# Patient Record
Sex: MALE | Race: WHITE | Employment: UNEMPLOYED | URBAN - METROPOLITAN AREA
[De-identification: names, ages, dates, MRNs, and addresses within clinical notes are randomized per-mention and may not be internally consistent; named-entity substitution may affect disease eponyms.]

---

## 2018-08-21 ENCOUNTER — ANESTHESIA EVENT (OUTPATIENT)
Dept: SURGERY | Age: 43
DRG: 853 | End: 2018-08-21
Payer: MEDICARE

## 2018-08-21 ENCOUNTER — ANESTHESIA (OUTPATIENT)
Dept: SURGERY | Age: 43
DRG: 853 | End: 2018-08-21
Payer: MEDICARE

## 2018-08-21 ENCOUNTER — HOSPITAL ENCOUNTER (INPATIENT)
Age: 43
LOS: 8 days | Discharge: HOME HEALTH | DRG: 853 | End: 2018-08-29
Attending: EMERGENCY MEDICINE | Admitting: INTERNAL MEDICINE
Payer: MEDICARE

## 2018-08-21 DIAGNOSIS — N49.2 SCROTAL ABSCESS: Primary | ICD-10-CM

## 2018-08-21 DIAGNOSIS — K92.2 LOWER GI BLEEDING: ICD-10-CM

## 2018-08-21 DIAGNOSIS — K61.1 PERIRECTAL ABSCESS: ICD-10-CM

## 2018-08-21 PROBLEM — A41.9 SEPSIS (HCC): Status: ACTIVE | Noted: 2018-08-21

## 2018-08-21 PROBLEM — L02.31 GLUTEAL ABSCESS: Status: ACTIVE | Noted: 2018-08-21

## 2018-08-21 PROCEDURE — 76060000036 HC ANESTHESIA 2.5 TO 3 HR: Performed by: SURGERY

## 2018-08-21 PROCEDURE — 74011250636 HC RX REV CODE- 250/636: Performed by: INTERNAL MEDICINE

## 2018-08-21 PROCEDURE — 99285 EMERGENCY DEPT VISIT HI MDM: CPT

## 2018-08-21 PROCEDURE — 76210000006 HC OR PH I REC 0.5 TO 1 HR: Performed by: SURGERY

## 2018-08-21 PROCEDURE — 77030008684 HC TU ET CUF COVD -B: Performed by: NURSE ANESTHETIST, CERTIFIED REGISTERED

## 2018-08-21 PROCEDURE — 74011250637 HC RX REV CODE- 250/637: Performed by: INTERNAL MEDICINE

## 2018-08-21 PROCEDURE — 96374 THER/PROPH/DIAG INJ IV PUSH: CPT

## 2018-08-21 PROCEDURE — 74011000272 HC RX REV CODE- 272: Performed by: SURGERY

## 2018-08-21 PROCEDURE — 74011000250 HC RX REV CODE- 250

## 2018-08-21 PROCEDURE — 87077 CULTURE AEROBIC IDENTIFY: CPT | Performed by: SURGERY

## 2018-08-21 PROCEDURE — 74011250636 HC RX REV CODE- 250/636

## 2018-08-21 PROCEDURE — 76010000132 HC OR TIME 2.5 TO 3 HR: Performed by: SURGERY

## 2018-08-21 PROCEDURE — 65270000029 HC RM PRIVATE

## 2018-08-21 PROCEDURE — 77030011640 HC PAD GRND REM COVD -A: Performed by: SURGERY

## 2018-08-21 PROCEDURE — 77030018836 HC SOL IRR NACL ICUM -A

## 2018-08-21 PROCEDURE — 87186 SC STD MICRODIL/AGAR DIL: CPT | Performed by: SURGERY

## 2018-08-21 PROCEDURE — 0TJB8ZZ INSPECTION OF BLADDER, VIA NATURAL OR ARTIFICIAL OPENING ENDOSCOPIC: ICD-10-PCS | Performed by: UROLOGY

## 2018-08-21 PROCEDURE — 77030018836 HC SOL IRR NACL ICUM -A: Performed by: SURGERY

## 2018-08-21 PROCEDURE — 87147 CULTURE TYPE IMMUNOLOGIC: CPT | Performed by: SURGERY

## 2018-08-21 PROCEDURE — 88304 TISSUE EXAM BY PATHOLOGIST: CPT | Performed by: SURGERY

## 2018-08-21 PROCEDURE — 77030007851 HC IRR CYSTO/TUR BSC -B: Performed by: SURGERY

## 2018-08-21 PROCEDURE — 74011000250 HC RX REV CODE- 250: Performed by: SURGERY

## 2018-08-21 PROCEDURE — 0JBB0ZZ EXCISION OF PERINEUM SUBCUTANEOUS TISSUE AND FASCIA, OPEN APPROACH: ICD-10-PCS | Performed by: UROLOGY

## 2018-08-21 PROCEDURE — 87205 SMEAR GRAM STAIN: CPT | Performed by: SURGERY

## 2018-08-21 PROCEDURE — 87075 CULTR BACTERIA EXCEPT BLOOD: CPT | Performed by: SURGERY

## 2018-08-21 PROCEDURE — 87070 CULTURE OTHR SPECIMN AEROBIC: CPT | Performed by: SURGERY

## 2018-08-21 PROCEDURE — 74011250636 HC RX REV CODE- 250/636: Performed by: EMERGENCY MEDICINE

## 2018-08-21 PROCEDURE — 0DJD8ZZ INSPECTION OF LOWER INTESTINAL TRACT, VIA NATURAL OR ARTIFICIAL OPENING ENDOSCOPIC: ICD-10-PCS | Performed by: SURGERY

## 2018-08-21 PROCEDURE — 77030026438 HC STYL ET INTUB CARD -A: Performed by: NURSE ANESTHETIST, CERTIFIED REGISTERED

## 2018-08-21 RX ORDER — HYDROMORPHONE HYDROCHLORIDE 2 MG/ML
INJECTION, SOLUTION INTRAMUSCULAR; INTRAVENOUS; SUBCUTANEOUS AS NEEDED
Status: DISCONTINUED | OUTPATIENT
Start: 2018-08-21 | End: 2018-08-21 | Stop reason: HOSPADM

## 2018-08-21 RX ORDER — MIDAZOLAM HYDROCHLORIDE 1 MG/ML
INJECTION, SOLUTION INTRAMUSCULAR; INTRAVENOUS AS NEEDED
Status: DISCONTINUED | OUTPATIENT
Start: 2018-08-21 | End: 2018-08-21 | Stop reason: HOSPADM

## 2018-08-21 RX ORDER — VANCOMYCIN/0.9 % SOD CHLORIDE 1 G/100 ML
1000 PLASTIC BAG, INJECTION (ML) INTRAVENOUS EVERY 8 HOURS
Status: DISCONTINUED | OUTPATIENT
Start: 2018-08-21 | End: 2018-08-21

## 2018-08-21 RX ORDER — LEVOFLOXACIN 5 MG/ML
500 INJECTION, SOLUTION INTRAVENOUS EVERY 24 HOURS
Status: DISCONTINUED | OUTPATIENT
Start: 2018-08-22 | End: 2018-08-22

## 2018-08-21 RX ORDER — OXYCODONE HYDROCHLORIDE 5 MG/1
5 TABLET ORAL
Status: DISCONTINUED | OUTPATIENT
Start: 2018-08-21 | End: 2018-08-27

## 2018-08-21 RX ORDER — ROCURONIUM BROMIDE 10 MG/ML
INJECTION, SOLUTION INTRAVENOUS AS NEEDED
Status: DISCONTINUED | OUTPATIENT
Start: 2018-08-21 | End: 2018-08-21 | Stop reason: HOSPADM

## 2018-08-21 RX ORDER — LIDOCAINE HYDROCHLORIDE 20 MG/ML
INJECTION, SOLUTION EPIDURAL; INFILTRATION; INTRACAUDAL; PERINEURAL AS NEEDED
Status: DISCONTINUED | OUTPATIENT
Start: 2018-08-21 | End: 2018-08-21 | Stop reason: HOSPADM

## 2018-08-21 RX ORDER — SODIUM CHLORIDE, SODIUM LACTATE, POTASSIUM CHLORIDE, CALCIUM CHLORIDE 600; 310; 30; 20 MG/100ML; MG/100ML; MG/100ML; MG/100ML
INJECTION, SOLUTION INTRAVENOUS
Status: DISCONTINUED | OUTPATIENT
Start: 2018-08-21 | End: 2018-08-21 | Stop reason: HOSPADM

## 2018-08-21 RX ORDER — DEXAMETHASONE SODIUM PHOSPHATE 4 MG/ML
INJECTION, SOLUTION INTRA-ARTICULAR; INTRALESIONAL; INTRAMUSCULAR; INTRAVENOUS; SOFT TISSUE AS NEEDED
Status: DISCONTINUED | OUTPATIENT
Start: 2018-08-21 | End: 2018-08-21 | Stop reason: HOSPADM

## 2018-08-21 RX ORDER — ONDANSETRON 2 MG/ML
4 INJECTION INTRAMUSCULAR; INTRAVENOUS
Status: DISCONTINUED | OUTPATIENT
Start: 2018-08-21 | End: 2018-08-29 | Stop reason: HOSPADM

## 2018-08-21 RX ORDER — PROPOFOL 10 MG/ML
INJECTION, EMULSION INTRAVENOUS AS NEEDED
Status: DISCONTINUED | OUTPATIENT
Start: 2018-08-21 | End: 2018-08-21 | Stop reason: HOSPADM

## 2018-08-21 RX ORDER — HYDROMORPHONE HYDROCHLORIDE 2 MG/ML
1 INJECTION, SOLUTION INTRAMUSCULAR; INTRAVENOUS; SUBCUTANEOUS
Status: DISCONTINUED | OUTPATIENT
Start: 2018-08-21 | End: 2018-08-29 | Stop reason: HOSPADM

## 2018-08-21 RX ORDER — IBUPROFEN 200 MG
1 TABLET ORAL DAILY
Status: DISCONTINUED | OUTPATIENT
Start: 2018-08-21 | End: 2018-08-29 | Stop reason: HOSPADM

## 2018-08-21 RX ORDER — METRONIDAZOLE 500 MG/100ML
500 INJECTION, SOLUTION INTRAVENOUS EVERY 12 HOURS
Status: DISPENSED | OUTPATIENT
Start: 2018-08-21 | End: 2018-08-28

## 2018-08-21 RX ORDER — ONDANSETRON 2 MG/ML
INJECTION INTRAMUSCULAR; INTRAVENOUS AS NEEDED
Status: DISCONTINUED | OUTPATIENT
Start: 2018-08-21 | End: 2018-08-21 | Stop reason: HOSPADM

## 2018-08-21 RX ORDER — PANTOPRAZOLE SODIUM 40 MG/1
40 TABLET, DELAYED RELEASE ORAL
Status: DISCONTINUED | OUTPATIENT
Start: 2018-08-22 | End: 2018-08-29 | Stop reason: HOSPADM

## 2018-08-21 RX ORDER — POTASSIUM CHLORIDE 750 MG/1
40 TABLET, FILM COATED, EXTENDED RELEASE ORAL
Status: COMPLETED | OUTPATIENT
Start: 2018-08-21 | End: 2018-08-21

## 2018-08-21 RX ORDER — ACETAMINOPHEN 10 MG/ML
INJECTION, SOLUTION INTRAVENOUS AS NEEDED
Status: DISCONTINUED | OUTPATIENT
Start: 2018-08-21 | End: 2018-08-21 | Stop reason: HOSPADM

## 2018-08-21 RX ORDER — ONDANSETRON 2 MG/ML
4 INJECTION INTRAMUSCULAR; INTRAVENOUS
Status: COMPLETED | OUTPATIENT
Start: 2018-08-21 | End: 2018-08-21

## 2018-08-21 RX ORDER — POLYETHYLENE GLYCOL 3350 17 G/17G
17 POWDER, FOR SOLUTION ORAL DAILY
Status: DISCONTINUED | OUTPATIENT
Start: 2018-08-21 | End: 2018-08-29 | Stop reason: HOSPADM

## 2018-08-21 RX ORDER — SUCCINYLCHOLINE CHLORIDE 20 MG/ML
INJECTION INTRAMUSCULAR; INTRAVENOUS AS NEEDED
Status: DISCONTINUED | OUTPATIENT
Start: 2018-08-21 | End: 2018-08-21 | Stop reason: HOSPADM

## 2018-08-21 RX ORDER — FENTANYL CITRATE 50 UG/ML
INJECTION, SOLUTION INTRAMUSCULAR; INTRAVENOUS AS NEEDED
Status: DISCONTINUED | OUTPATIENT
Start: 2018-08-21 | End: 2018-08-21 | Stop reason: HOSPADM

## 2018-08-21 RX ORDER — SODIUM CHLORIDE 9 MG/ML
100 INJECTION, SOLUTION INTRAVENOUS CONTINUOUS
Status: DISCONTINUED | OUTPATIENT
Start: 2018-08-21 | End: 2018-08-29 | Stop reason: HOSPADM

## 2018-08-21 RX ORDER — ACETAMINOPHEN 325 MG/1
650 TABLET ORAL
Status: DISCONTINUED | OUTPATIENT
Start: 2018-08-21 | End: 2018-08-29 | Stop reason: HOSPADM

## 2018-08-21 RX ADMIN — LIDOCAINE HYDROCHLORIDE 80 MG: 20 INJECTION, SOLUTION EPIDURAL; INFILTRATION; INTRACAUDAL; PERINEURAL at 19:01

## 2018-08-21 RX ADMIN — MIDAZOLAM HYDROCHLORIDE 2 MG: 1 INJECTION, SOLUTION INTRAMUSCULAR; INTRAVENOUS at 18:58

## 2018-08-21 RX ADMIN — FENTANYL CITRATE 100 MCG: 50 INJECTION, SOLUTION INTRAMUSCULAR; INTRAVENOUS at 20:00

## 2018-08-21 RX ADMIN — OXYCODONE HYDROCHLORIDE 5 MG: 5 TABLET ORAL at 15:43

## 2018-08-21 RX ADMIN — ONDANSETRON 4 MG: 2 INJECTION, SOLUTION INTRAMUSCULAR; INTRAVENOUS at 10:59

## 2018-08-21 RX ADMIN — SUCCINYLCHOLINE CHLORIDE 180 MG: 20 INJECTION INTRAMUSCULAR; INTRAVENOUS at 19:01

## 2018-08-21 RX ADMIN — LIDOCAINE HYDROCHLORIDE 100 MG: 20 INJECTION, SOLUTION EPIDURAL; INFILTRATION; INTRACAUDAL; PERINEURAL at 20:48

## 2018-08-21 RX ADMIN — DEXAMETHASONE SODIUM PHOSPHATE 8 MG: 4 INJECTION, SOLUTION INTRA-ARTICULAR; INTRALESIONAL; INTRAMUSCULAR; INTRAVENOUS; SOFT TISSUE at 19:16

## 2018-08-21 RX ADMIN — SODIUM CHLORIDE 500 ML: 900 INJECTION, SOLUTION INTRAVENOUS at 12:58

## 2018-08-21 RX ADMIN — ROCURONIUM BROMIDE 5 MG: 10 INJECTION, SOLUTION INTRAVENOUS at 19:01

## 2018-08-21 RX ADMIN — MIDAZOLAM HYDROCHLORIDE 2 MG: 1 INJECTION, SOLUTION INTRAMUSCULAR; INTRAVENOUS at 18:55

## 2018-08-21 RX ADMIN — SODIUM CHLORIDE, SODIUM LACTATE, POTASSIUM CHLORIDE, CALCIUM CHLORIDE: 600; 310; 30; 20 INJECTION, SOLUTION INTRAVENOUS at 18:43

## 2018-08-21 RX ADMIN — SODIUM CHLORIDE 100 ML/HR: 900 INJECTION, SOLUTION INTRAVENOUS at 21:53

## 2018-08-21 RX ADMIN — PROPOFOL 100 MG: 10 INJECTION, EMULSION INTRAVENOUS at 20:47

## 2018-08-21 RX ADMIN — HYDROMORPHONE HYDROCHLORIDE 1 MG: 2 INJECTION, SOLUTION INTRAMUSCULAR; INTRAVENOUS; SUBCUTANEOUS at 15:37

## 2018-08-21 RX ADMIN — ACETAMINOPHEN 1000 MG: 10 INJECTION, SOLUTION INTRAVENOUS at 20:58

## 2018-08-21 RX ADMIN — PROPOFOL 200 MG: 10 INJECTION, EMULSION INTRAVENOUS at 19:01

## 2018-08-21 RX ADMIN — FENTANYL CITRATE 100 MCG: 50 INJECTION, SOLUTION INTRAMUSCULAR; INTRAVENOUS at 19:01

## 2018-08-21 RX ADMIN — HYDROMORPHONE HYDROCHLORIDE 1 MG: 2 INJECTION, SOLUTION INTRAMUSCULAR; INTRAVENOUS; SUBCUTANEOUS at 20:47

## 2018-08-21 RX ADMIN — SODIUM CHLORIDE, SODIUM LACTATE, POTASSIUM CHLORIDE, CALCIUM CHLORIDE: 600; 310; 30; 20 INJECTION, SOLUTION INTRAVENOUS at 20:02

## 2018-08-21 RX ADMIN — HYDROMORPHONE HYDROCHLORIDE 0.4 MG: 2 INJECTION, SOLUTION INTRAMUSCULAR; INTRAVENOUS; SUBCUTANEOUS at 20:40

## 2018-08-21 RX ADMIN — VANCOMYCIN HYDROCHLORIDE 1000 MG: 1 INJECTION, POWDER, LYOPHILIZED, FOR SOLUTION INTRAVENOUS at 12:44

## 2018-08-21 RX ADMIN — METRONIDAZOLE 500 MG: 500 INJECTION, SOLUTION INTRAVENOUS at 16:07

## 2018-08-21 RX ADMIN — POTASSIUM CHLORIDE 40 MEQ: 750 TABLET, EXTENDED RELEASE ORAL at 12:41

## 2018-08-21 RX ADMIN — ONDANSETRON 4 MG: 2 INJECTION INTRAMUSCULAR; INTRAVENOUS at 20:58

## 2018-08-21 RX ADMIN — OXYCODONE HYDROCHLORIDE 5 MG: 5 TABLET ORAL at 11:51

## 2018-08-21 RX ADMIN — VANCOMYCIN HYDROCHLORIDE 1000 MG: 1 INJECTION, POWDER, LYOPHILIZED, FOR SOLUTION INTRAVENOUS at 22:49

## 2018-08-21 NOTE — ED PROVIDER NOTES
EMERGENCY DEPARTMENT HISTORY AND PHYSICAL EXAM      Date: 8/21/2018  Patient Name: Derek Lion    History of Presenting Illness     Chief Complaint   Patient presents with    Abscess     txfer from [facility] for sacral/scrotal abscess requiring surgical intervention       History Provided By: Patient    HPI: Derek Lion, 39 y.o. male with PMHx significant for gluteal abscess with previous I&D, presents via EMS from [facility] to the ED with cc of 10/10 buttock pain secondary to abscess, worsening over the past several weeks. He reports associated bloody stools and rectal bleeding. Pt states pain is exacerbated by movement. He states he is unable to stand or walk due to the pain. He notes that he had an I&D of a gluteal abscess 1.5 years ago at [facility] but he is unable to remember the doctor who performed it. Pt received Abx at [facility]. He affirms EtOH use, noting a recent increase in intake due to pain. Pt denies CP, SOB, delusions, or hallucination. There are no other complaints, changes, or physical findings at this time.     PCP: None    Current Facility-Administered Medications   Medication Dose Route Frequency Provider Last Rate Last Dose    lidocaine (XYLOCAINE) 2 % jelly   Topical PRN Ruddy Willard MD        levoFLOXacin (LEVAQUIN) 750 mg in D5W IVPB  750 mg IntraVENous Q24H Ruddy Willard  mL/hr at 08/23/18 0425 750 mg at 08/23/18 0425    sodium hypochlorite (QUARTER STRENGTH DAKIN'S) 0.125% irrigation (bottle)   Topical DAILY Ruddy Willard MD        LORazepam (ATIVAN) injection 2 mg  2 mg IntraVENous Q1H PRN Ruddy Willard MD   2 mg at 08/22/18 1347    LORazepam (ATIVAN) injection 4 mg  4 mg IntraVENous Q1H PRN Ruddy Willard MD        0.9% sodium chloride infusion  100 mL/hr IntraVENous CONTINUOUS Mandeep Morris  mL/hr at 08/22/18 1232 100 mL/hr at 08/22/18 1232    metroNIDAZOLE (FLAGYL) IVPB premix 500 mg  500 mg IntraVENous Q12H Mandeep Morris  mL/hr at 08/23/18 1537 500 mg at 08/23/18 1537    oxyCODONE IR (ROXICODONE) tablet 5 mg  5 mg Oral Q4H PRN Orlando Herzog MD   5 mg at 08/23/18 0844    HYDROmorphone (PF) (DILAUDID) injection 1 mg  1 mg IntraVENous Q4H PRN Orlando Herzog MD   1 mg at 08/23/18 1237    ondansetron (ZOFRAN) injection 4 mg  4 mg IntraVENous Q6H PRN Orlando Herzog MD   4 mg at 08/22/18 0951    polyethylene glycol (MIRALAX) packet 17 g  17 g Oral DAILY Orlando Herzog MD        nicotine (NICODERM CQ) 14 mg/24 hr patch 1 Patch  1 Patch TransDERmal DAILY Orlando Herzog MD   1 Patch at 08/23/18 0900    acetaminophen (TYLENOL) tablet 650 mg  650 mg Oral Q4H PRN Orlando Herzog MD        pantoprazole (PROTONIX) tablet 40 mg  40 mg Oral ACB Orlando Herzog MD   40 mg at 08/23/18 4067    vancomycin (VANCOCIN) 1,000 mg in 0.9% sodium chloride (MBP/ADV) 250 mL  1,000 mg IntraVENous Q8H Orlando Herzog  mL/hr at 08/23/18 1329 1,000 mg at 08/23/18 1329       Past History     Past Medical History:  Past Medical History:   Diagnosis Date    Gluteal abscess     Recurrent    Ill-defined condition     GOUT       Past Surgical History:  Past Surgical History:   Procedure Laterality Date    HX OTHER SURGICAL  2016    Gluteal abscess I&D       Family History:  Family History   Problem Relation Age of Onset    Arthritis Mother     Heart Failure Mother     Hypertension Father        Social History:  Social History   Substance Use Topics    Smoking status: Current Every Day Smoker     Packs/day: 1.00     Years: 15.00    Smokeless tobacco: Never Used    Alcohol use 24.0 oz/week     40 Cans of beer per week       Allergies: Allergies   Allergen Reactions    Penicillins Nausea and Vomiting    Shellfish Derived Shortness of Breath and Swelling         Review of Systems   Review of Systems   Constitutional: Negative. Negative for activity change, appetite change, chills, fatigue, fever and unexpected weight change. HENT: Negative.   Negative for congestion, hearing loss, rhinorrhea, sneezing and voice change. Eyes: Negative. Negative for pain and visual disturbance. Respiratory: Negative. Negative for apnea, cough, choking, chest tightness and shortness of breath. Cardiovascular: Negative. Negative for chest pain and palpitations. Gastrointestinal: Positive for anal bleeding and blood in stool. Negative for abdominal distention, abdominal pain, diarrhea, nausea and vomiting. Genitourinary: Negative. Negative for difficulty urinating, flank pain, frequency and urgency. No discharge   Musculoskeletal: Negative. Negative for arthralgias, back pain, myalgias and neck stiffness. Skin: Positive for wound (abscess to buttocks). Negative for color change and rash. Neurological: Negative. Negative for dizziness, seizures, syncope, speech difficulty, weakness, numbness and headaches. Hematological: Negative for adenopathy. Psychiatric/Behavioral: Negative. Negative for agitation, behavioral problems, dysphoric mood, hallucinations and suicidal ideas. The patient is not nervous/anxious. Physical Exam   Physical Exam   Constitutional: He is oriented to person, place, and time. He appears well-developed and well-nourished. No distress. HENT:   Head: Normocephalic and atraumatic. Mouth/Throat: Oropharynx is clear and moist. No oropharyngeal exudate. Eyes: Conjunctivae and EOM are normal. Pupils are equal, round, and reactive to light. Right eye exhibits no discharge. Left eye exhibits no discharge. Neck: Normal range of motion. Neck supple. Cardiovascular: Regular rhythm and intact distal pulses. Tachycardia present. Exam reveals no gallop and no friction rub. No murmur heard. Pulmonary/Chest: Effort normal and breath sounds normal. No respiratory distress. He has no wheezes. He has no rales. He exhibits no tenderness. Abdominal: Soft. Bowel sounds are normal. He exhibits no distension and no mass.  There is no tenderness. There is no rebound and no guarding. Musculoskeletal: Normal range of motion. He exhibits no edema. Lymphadenopathy:     He has no cervical adenopathy. Neurological: He is alert and oriented to person, place, and time. No cranial nerve deficit. Coordination normal.   Skin: Skin is warm and dry. No rash noted. Indurated gluteal cleft with erythematous and macerated tissue to the scrotum. Psychiatric: He has a normal mood and affect. Nursing note and vitals reviewed. Diagnostic Study Results     Labs -     Recent Results (from the past 12 hour(s))   METABOLIC PANEL, COMPREHENSIVE    Collection Time: 08/23/18  5:56 AM   Result Value Ref Range    Sodium 141 136 - 145 mmol/L    Potassium 3.3 (L) 3.5 - 5.1 mmol/L    Chloride 107 97 - 108 mmol/L    CO2 26 21 - 32 mmol/L    Anion gap 8 5 - 15 mmol/L    Glucose 95 65 - 100 mg/dL    BUN 7 6 - 20 MG/DL    Creatinine 0.67 (L) 0.70 - 1.30 MG/DL    BUN/Creatinine ratio 10 (L) 12 - 20      GFR est AA >60 >60 ml/min/1.73m2    GFR est non-AA >60 >60 ml/min/1.73m2    Calcium 7.7 (L) 8.5 - 10.1 MG/DL    Bilirubin, total 0.2 0.2 - 1.0 MG/DL    ALT (SGPT) 13 12 - 78 U/L    AST (SGOT) 11 (L) 15 - 37 U/L    Alk. phosphatase 55 45 - 117 U/L    Protein, total 6.1 (L) 6.4 - 8.2 g/dL    Albumin 1.8 (L) 3.5 - 5.0 g/dL    Globulin 4.3 (H) 2.0 - 4.0 g/dL    A-G Ratio 0.4 (L) 1.1 - 2.2     VANCOMYCIN, TROUGH    Collection Time: 08/23/18  5:56 AM   Result Value Ref Range    Vancomycin,trough 13.1 (H) 5.0 - 10.0 ug/mL    Reported dose date: 12251381      Reported dose time: 2100      Reported dose: 1000 MG UNITS       Radiologic Studies -   No orders to display     CT Results  (Last 48 hours)    None        CXR Results  (Last 48 hours)    None            Medical Decision Making   I am the first provider for this patient. I reviewed the vital signs, available nursing notes, past medical history, past surgical history, family history and social history.     Vital Signs-Reviewed the patient's vital signs. Patient Vitals for the past 12 hrs:   Temp Pulse Resp BP SpO2   08/23/18 1436 97.6 °F (36.4 °C) (!) 101 16 149/85 99 %   08/23/18 1108 97.7 °F (36.5 °C) (!) 114 18 135/76 98 %   08/23/18 0700 97.8 °F (36.6 °C) 94 16 121/80 97 %       Records Reviewed: Nursing Notes and Old Medical Records    Provider Notes (Medical Decision Making):   Rectal abscess    ED Course:   Initial assessment performed. The patients presenting problems have been discussed, and they are in agreement with the care plan formulated and outlined with them. I have encouraged them to ask questions as they arise throughout their visit. 9:48 AM  Chart review. Pt treated with Abx, Levaquin and flagyl, prior to transport. Consult Note:  9:55 AM  Ba Woodard MD spoke with Lg Arndt MD  Specialty: General Surgery  Discussed pt's hx, disposition, and available diagnostic and imaging results. Dr. Lashonda Adams will evaluate pt. Consult Note:  11:00 AM  Ba Woodard MD spoke with Randa Duarte MD  Specialty: Colorectal Surgery  Discussed pt's hx, disposition, and available diagnostic and imaging results. After reviewing results, Dr. Hazel Tejada recommends admission to hospitalist and Abx. He will have Dr. Juanis Juárez evaluate pt after he is finished with a surgery. Consult Note:  11:20 AM  Ba Woodard MD spoke with Franko Moctezuma MD  Specialty: Hospitalist  Discussed pt's hx, disposition, and available diagnostic and imaging results. Dr. Paulette Arana will admit pt to Dr. Sam Lam service. Disposition:  Admit Note:  11:22 AM  Pt is being admitted by Dr. Paulette Arana for Dr. Juanis Juárez . The results of their tests and reason(s) for their admission have been discussed with pt and/or available family. They convey agreement and understanding for the need to be admitted and for admission diagnosis. Diagnosis     Clinical Impression:   1. Perirectal abscess    2.  Lower GI bleeding Attestations: This note is prepared by Myrna Pennington, acting as a Scribe for [employer] Radha Castro MD: The scribe's documentation has been prepared under my direction and personally reviewed by me in its entirety. I confirm that the notes above accurately reflects all work, treatment, procedures, and medical decision making performed by me.

## 2018-08-21 NOTE — PROGRESS NOTES
PT note:    Orders received and acknowledged. Chart reviewed and spoke with nursing. Per nursing, patient is mobilizing well although with severe pain at this moment. RN requesting to defer PT evaluation until pain is better controlled.      Evelin Harris, PT, DPT

## 2018-08-21 NOTE — PERIOP NOTES
Called ED to get report on patient and was advised patient had 8 ounces of Ginger Ale about an hour ago due to hospitalist clear liquid order. OR supervisor made aware and Dr. Shae Chavarria called and made aware. Dr. Rush Berry presently in operating room with a patient. Deisy Dewitt, ED RN and made her aware to keep patient NPO per Dr. Shae Chavarria.

## 2018-08-21 NOTE — ED NOTES
Pt's groin area cleaned of dried blood. Scrotum and perineal area back to rectum indurated with abscesses with heads noted. Soaked pads left for 30 min to loosen dried blood prior to attempt to clean the area. Bright red blood noted to be leaking from rectum. Pt exquisitely tender to any pressure in this area. Pt medicated for pain after cleaning.

## 2018-08-21 NOTE — ED NOTES
TRANSFER - OUT REPORT:    Verbal report given to [facility] (name) on Romana Drew  being transferred to Room 2107 Gen Surg (unit) for routine progression of care       Report consisted of patients Situation, Background, Assessment and   Recommendations(SBAR). Information from the following report(s) SBAR, ED Summary, [facility] and Recent Results was reviewed with the receiving nurse. Lines:   Peripheral IV 08/21/18 Right Hand (Active)   Site Assessment Clean, dry, & intact 8/21/2018  9:47 AM   Phlebitis Assessment 0 8/21/2018  9:47 AM   Infiltration Assessment 0 8/21/2018  7:28 AM   Dressing Status Clean, dry, & intact 8/21/2018  9:47 AM   Dressing Type Transparent 8/21/2018  9:47 AM   Hub Color/Line Status Pink 8/21/2018  9:47 AM        Opportunity for questions and clarification was provided.

## 2018-08-21 NOTE — ED TRIAGE NOTES
Pt transferred from [facility] for abscess/wound on his buttocks and scrotum. Apparently, the wound started on the buttocks and tunneled through to his scrotum. The plan is to go to the OR for debridement. Pt is a daily alcohol user, reporting consumption of up to 8 beers/day. Prior to arrival, reported interventions were:  500 mg Flagyl IV  750 mg Levaquin IV  1 gram Mag IV  Vancomycin IV    Pain control:  6 mg morphine  200 mcg fentanyl followed by   100 mcg Fentanyl    Lab values:  WBC >29  K 3.2  Negative lactic acid    Pt arrives A&O x4 and is a good historian. Pt denies history of alcohol withdrawal when he has been hospitalized previously.

## 2018-08-21 NOTE — IP AVS SNAPSHOT
[address] [facility] 
[phone] Patient: Michelle Odonnell MRN: [id number] OJE:[birth year]8 A check dianna indicates which time of day the medication should be taken. My Medications START taking these medications Instructions Each Dose to Equal  
 Morning Noon Evening Bedtime HYDROmorphone 4 mg tablet Commonly known as:  DILAUDID Your last dose was: Your next dose is: Take 1 Tab by mouth every four (4) hours as needed. Max Daily Amount: 24 mg.  
 4 mg CONTINUE taking these medications Instructions Each Dose to Equal  
 Morning Noon Evening Bedtime  
 aspirin 325 mg tablet Commonly known as:  ASPIRIN Your last dose was: Your next dose is: Take 975-1,300 mg by mouth every six (6) hours as needed for Pain. 975-1300 mg Where to Get Your Medications Information on where to get these meds will be given to you by the nurse or doctor. ! Ask your nurse or doctor about these medications HYDROmorphone 4 mg tablet

## 2018-08-21 NOTE — ANESTHESIA PREPROCEDURE EVALUATION
Anesthetic History   No history of anesthetic complications            Review of Systems / Medical History  Patient summary reviewed, nursing notes reviewed and pertinent labs reviewed    Pulmonary  Within defined limits        Smoker      Comments: Smoker - 1 ppd x 15 years   Neuro/Psych   Within defined limits           Cardiovascular  Within defined limits                Exercise tolerance: >4 METS     GI/Hepatic/Renal  Within defined limits             Comments: Perianal Abscess Endo/Other  Within defined limits           Other Findings   Comments: Sepsis  Gout  Marijuana use  ETOH - 40 cans of beer per week         Physical Exam    Airway  Mallampati: II  TM Distance: 4 - 6 cm  Neck ROM: normal range of motion   Mouth opening: Normal     Cardiovascular  Regular rate and rhythm,  S1 and S2 normal,  no murmur, click, rub, or gallop             Dental  No notable dental hx       Pulmonary  Breath sounds clear to auscultation               Abdominal  GI exam deferred       Other Findings            Anesthetic Plan    ASA: 3  Anesthesia type: general    Monitoring Plan: BIS      Induction: Intravenous  Anesthetic plan and risks discussed with: Patient

## 2018-08-21 NOTE — PROGRESS NOTES
Pharmacy Automatic Renal Dosing Protocol - Antimicrobials/Vancomycin    Indication for Antimicrobials: gluteal abscess     Current Regimen of Each Antimicrobial:  Vancomycin IV (Start Date ; Day # 1 of )  Metronidazole 500 mg IV q 8 hours (Start Date ; Day # 1 )  Levofloxacin 500 mg IV q 24 hours (Start Date ; Day # 1 )    Previous Antimicrobial Therapy:  Patient received vancomycin (1000 mg IV), levofloxacin (750 mg IV) and metronidazole (500 mg IV) x 1 at [facility] on  prior to [address] ED transfer    Goal Level: VANCOMYCIN TROUGH GOAL RANGE    Vancomycin Trough: 15 - 20 mcg/mL    Significant Cultures:    (blood): in process   (urine): in process    Radiology / Imaging results: (X-ray, CT scan or MRI):    (CT abd): extensive inflammatory changes in gluteal soft tissue abscess along L aspect of gluteal crease and extending to base of scrotum    Paralysis, amputations, malnutrition: N/A    Labs:  Recent Labs      18   0115   CREA  0.99   BUN  10   WBC  28.9*     Temp (24hrs), Av.3 °F (36.8 °C), Min:98 °F (36.7 °C), Max:98.5 °F (36.9 °C)    Creatinine Clearance (mL/min) or Dialysis: ~100 mL/min    Impression/Plan:   · Scr looks possibly at baseline(no previous data for comparison)  · Levofloxacin dosing appropriate per protocol for indication and renal function, first 500 mg dose retimed to   · Vancomycin 1000 mg IV q 8 hours starting ~6 hours after 1000 mg IV x1 administered at [facility]  · Vancomycin level due prior to 5th dose  · Metronidazole dose adjusted to 500 mg IV q 12 hours per protocol   · Antimicrobial stop date 7 days  · Daily BMP ordered per protocol     Pharmacy will follow daily and adjust medications as appropriate for renal function and/or serum levels.     Thank you,    Stewart Laird, PharmD, [address] [facility] Pharm[facility] Specialist

## 2018-08-21 NOTE — PROGRESS NOTES
Pharmacy Clarification of Prior to Admission Medication Regimen     The patient was interviewed regarding clarification of the prior to admission medication regimen and was questioned regarding use of any other inhalers, topical products, over the counter medications, herbal medications, vitamin products or ophthalmic/nasal/otic medication use. Information Obtained From: Patient    Pertinent Pharmacy Findings:   Updated patients preferred outpatient pharmacy to: [facility] [address]      PTA medication list was corrected to the following:     Prior to Admission Medications   Prescriptions Last Dose Informant Patient Reported? Taking?   aspirin (ASPIRIN) 325 mg tablet 8/20/2018 at Unknown time Self Yes Yes   Sig: Take 975-1,300 mg by mouth every six (6) hours as needed for Pain.       Facility-Administered Medications: None          Thank you,  Yesy Gold CPhT  Medication History Pharmacy Technician

## 2018-08-21 NOTE — H&P
Hospitalist Admission Note    NAME: Daniela Chaudhry   :  1975   MRN:  [id number]     Date/Time:  2018 11:40 AM    Patient PCP: None  ______________________________________________________________________  Given the patient's current clinical presentation, I have a high level of concern for decompensation if discharged from the emergency department. Complex decision making was performed, which includes reviewing the patient's available past medical records, laboratory results, and x-ray films. My assessment of this patient's clinical condition and my plan of care is as follows. Assessment / Plan:  Sepsis POA: due to gluteal/scrotal abscess with tachycardia, leukocytosis, start IVF and ABX. Gluteal/Scrotal Abscess: start LVQ/Flagyl/Vancomycin, get Colorectal Surgery and Urology evaluations. Smoker: counseled, start nicotine patch. Hypokalemia: replace and monitor. Code Status: Full Code  Surrogate Decision Maker: father Alexsander Lombardi [phone]  DVT Prophylaxis: SCD  GI Prophylaxis: not indicated  Baseline: Independent    Patient is been admitted to [address] [place] [address], no medical problems, please call if any questions. Subjective:   CHIEF COMPLAINT: \"My back hurts and I'm bleeding\"    HISTORY OF PRESENT ILLNESS:     Nixon Billings is a 39 y.o.  male with PMH of back Surgery, was sent from [facility] today due to gluteal and scrotal abscess. Transfer from [facility] for sacral/scrotal abscess requiring sx intervention. Had I&D 1.5 years ago by surgeon at [facility], [address] remember the name. Rectal bleeding. Abx given at [address] this time patient is lying in bed c/o rectal, gluteal pain and bleeding, with erythematous area involving left gluteus and scrotum, has chills and night sweats, also nausea, denies chest pain, no SOB, no fever, no diarrhea, no urinary symptoms, no other associated symptoms.   We were asked to admit for work up and evaluation of the above problems. Past Medical History:   Diagnosis Date    Gluteal abscess     Recurrent    Ill-defined condition     GOUT        Past Surgical History:   Procedure Laterality Date    HX OTHER SURGICAL  2016    Gluteal abscess I&D       Social History   Substance Use Topics    Smoking status: Current Every Day Smoker     Packs/day: 1.00     Years: 15.00    Smokeless tobacco: Never Used    Alcohol use 24.0 oz/week     40 Cans of beer per week        Family History   Problem Relation Age of Onset    Arthritis Mother     Heart Failure Mother     Hypertension Father      Allergies   Allergen Reactions    Penicillins Nausea and Vomiting    Shellfish Derived Shortness of Breath and Swelling        Prior to Admission medications    Medication Sig Start Date End Date Taking? Authorizing Provider   aspirin (ASPIRIN) 325 mg tablet Take 975-1,300 mg by mouth every six (6) hours as needed for Pain. Yes Phys Other, MD       REVIEW OF SYSTEMS:     I am not able to complete the review of systems because:    The patient is intubated and sedated    The patient has altered mental status due to his acute medical problems    The patient has baseline aphasia from prior stroke(s)    The patient has baseline dementia and is not reliable historian    The patient is in acute medical distress and unable to provide information           Total of 12 systems reviewed as follows:       POSITIVE= underlined text  Negative = text not underlined  General:  fever, chills, sweats, generalized weakness, weight loss/gain,      loss of appetite   Eyes:    blurred vision, eye pain, loss of vision, double vision  ENT:    rhinorrhea, pharyngitis   Respiratory:   cough, sputum production, SOB, FUENTES, wheezing, pleuritic pain   Cardiology:   chest pain, palpitations, orthopnea, PND, edema, syncope   Gastrointestinal:  abdominal pain , N/V, diarrhea, dysphagia, constipation, bleeding   Genitourinary:  frequency, urgency, dysuria, hematuria, incontinence   Muskuloskeletal :  arthralgia, myalgia, back pain  Hematology:  easy bruising, nose or gum bleeding, lymphadenopathy   Dermatological: rash, ulceration, pruritis, color change / jaundice  Endocrine:   hot flashes or polydipsia   Neurological:  headache, dizziness, confusion, focal weakness, paresthesia,     Speech difficulties, memory loss, gait difficulty  Psychological: Feelings of anxiety, depression, agitation    Objective:   VITALS:    Visit Vitals    /90 (BP 1 Location: Right arm, BP Patient Position: At rest)    Pulse (!) 110    Temp 98 °F (36.7 °C)    Resp 17    Ht 6' 2\" (1.88 m)    Wt 81.6 kg (179 lb 14.3 oz)    SpO2 98%    BMI 23.1 kg/m2       PHYSICAL EXAM:    General:    Alert, cooperative, in pain, appears stated age. HEENT: Atraumatic, anicteric sclerae, pink conjunctivae     No oral ulcers, mucosa moist, throat clear, dentition poor  Neck:  Supple, symmetrical,  thyroid: non tender  Lungs:   Coarse BS  Chest wall:  No tenderness  No Accessory muscle use. Heart:   Regular  rhythm,  No  murmur   No edema  Abdomen:   Soft, non-tender. Not distended. Bowel sounds normal  Extremities: No cyanosis. No clubbing,      Skin turgor normal, Capillary refill normal, Radial dial pulse 2+  Skin:     Not pale. Not Jaundiced  + rashes in gluteal and scrotal areas  Psych:  Good insight. Not depressed. Not anxious or agitated. Neurologic: EOMs intact. No facial asymmetry. No aphasia or slurred speech. Symmetrical strength, Sensation grossly intact.  Alert and oriented X 4.     _______________________________________________________________________  Care Plan discussed with:    Comments   Patient y    Family  y father   RN y    Care Manager                    Consultant:      _______________________________________________________________________  Expected  Disposition:   Home with Family    HH/PT/OT/RN y   SNF/LTC    YEMI ________________________________________________________________________  TOTAL TIME:  60 Minutes    Critical Care Provided     Minutes non procedure based      Comments    y Reviewed previous records   >50% of visit spent in counseling and coordination of care y Discussion with patient and/or family and questions answered       ________________________________________________________________________  Signed: Kieran Beckett MD    Procedures: see electronic medical records for all procedures/Xrays and details which were not copied into this note but were reviewed prior to creation of Plan.     LAB DATA REVIEWED:    Recent Results (from the past 24 hour(s))   URINALYSIS W/ REFLEX CULTURE    Collection Time: 08/21/18  1:00 AM   Result Value Ref Range    Color YELLOW/STRAW      Appearance CLEAR CLEAR      Specific gravity 1.010 1.003 - 1.030      pH (UA) 5.5 5.0 - 8.0      Protein NEGATIVE  NEG mg/dL    Glucose NEGATIVE  NEG mg/dL    Ketone NEGATIVE  NEG mg/dL    Bilirubin NEGATIVE  NEG      Blood SMALL (A) NEG      Urobilinogen 0.2 0.2 - 1.0 EU/dL    Nitrites NEGATIVE  NEG      Leukocyte Esterase NEGATIVE  NEG      WBC 0-4 0 - 4 /hpf    RBC 0-5 0 - 5 /hpf    Epithelial cells FEW FEW /lpf    Bacteria NEGATIVE  NEG /hpf    UA:UC IF INDICATED CULTURE NOT INDICATED BY UA RESULT CNI      Amorphous Crystals FEW (A) NEG     CULTURE, BLOOD, PAIRED    Collection Time: 08/21/18  1:00 AM   Result Value Ref Range    Special Requests: NO SPECIAL REQUESTS      Culture result: NO GROWTH AFTER 3 HOURS     LACTIC ACID    Collection Time: 08/21/18  1:10 AM   Result Value Ref Range    Lactic acid 1.8 0.4 - 2.0 MMOL/L   CBC WITH AUTOMATED DIFF    Collection Time: 08/21/18  1:15 AM   Result Value Ref Range    WBC 28.9 (H) 4.1 - 11.1 K/uL    RBC 3.96 (L) 4.10 - 5.70 M/uL    HGB 11.5 (L) 12.1 - 17.0 g/dL    HCT 35.1 (L) 36.6 - 50.3 %    MCV 88.6 80.0 - 99.0 FL    MCH 29.0 26.0 - 34.0 PG    MCHC 32.8 30.0 - 36.5 g/dL    RDW 13.2 11.5 - 14.5 %    PLATELET 494 (H) 503 - 400 K/uL    MPV 8.8 (L) 8.9 - 12.9 FL    NRBC 0.0 0  WBC    ABSOLUTE NRBC 0.00 0.00 - 0.01 K/uL    NEUTROPHILS 82 (H) 32 - 75 %    LYMPHOCYTES 8 (L) 12 - 49 %    MONOCYTES 8 5 - 13 %    EOSINOPHILS 1 0 - 7 %    BASOPHILS 0 0 - 1 %    IMMATURE GRANULOCYTES 1 (H) 0.0 - 0.5 %    ABS. NEUTROPHILS 23.7 (H) 1.8 - 8.0 K/UL    ABS. LYMPHOCYTES 2.3 0.8 - 3.5 K/UL    ABS. MONOCYTES 2.3 (H) 0.0 - 1.0 K/UL    ABS. EOSINOPHILS 0.3 0.0 - 0.4 K/UL    ABS. BASOPHILS 0.0 0.0 - 0.1 K/UL    ABS. IMM. GRANS. 0.3 (H) 0.00 - 0.04 K/UL    DF AUTOMATED      PLATELET COMMENTS Increased Platelets     METABOLIC PANEL, COMPREHENSIVE    Collection Time: 08/21/18  1:15 AM   Result Value Ref Range    Sodium 137 136 - 145 mmol/L    Potassium 3.2 (L) 3.5 - 5.1 mmol/L    Chloride 101 97 - 108 mmol/L    CO2 27 21 - 32 mmol/L    Anion gap 9 5 - 15 mmol/L    Glucose 123 (H) 65 - 100 mg/dL    BUN 10 6 - 20 MG/DL    Creatinine 0.99 0.70 - 1.30 MG/DL    BUN/Creatinine ratio 10 (L) 12 - 20      GFR est AA >60 >60 ml/min/1.73m2    GFR est non-AA >60 >60 ml/min/1.73m2    Calcium 8.6 8.5 - 10.1 MG/DL    Bilirubin, total 0.3 0.2 - 1.0 MG/DL    ALT (SGPT) 16 12 - 78 U/L    AST (SGOT) 16 15 - 37 U/L    Alk.  phosphatase 87 45 - 117 U/L    Protein, total 8.2 6.4 - 8.2 g/dL    Albumin 2.3 (L) 3.5 - 5.0 g/dL    Globulin 5.9 (H) 2.0 - 4.0 g/dL    A-G Ratio 0.4 (L) 1.1 - 2.2     LIPASE    Collection Time: 08/21/18  1:15 AM   Result Value Ref Range    Lipase 107 73 - 393 U/L   PROTHROMBIN TIME + INR    Collection Time: 08/21/18  1:45 AM   Result Value Ref Range    INR 1.0 0.9 - 1.1      Prothrombin time 9.7 9.0 - 11.1 sec   PTT    Collection Time: 08/21/18  1:45 AM   Result Value Ref Range    aPTT 27.5 22.1 - 32.0 sec    aPTT, therapeutic range     58.0 - 77.0 SECS   EKG, 12 LEAD, INITIAL    Collection Time: 08/21/18  1:55 AM   Result Value Ref Range    Ventricular Rate 121 BPM    Atrial Rate 121 BPM    P-R Interval 138 ms    QRS Duration 76 ms    Q-T Interval 324 ms    QTC Calculation (Bezet) 460 ms    Calculated P Axis 62 degrees    Calculated R Axis 44 degrees    Calculated T Axis 45 degrees    Diagnosis       Sinus tachycardia  Nonspecific ST abnormality  Abnormal ECG  No previous ECGs available

## 2018-08-21 NOTE — CONSULTS
Surgery Consult    Subjective:      Daniela Chaudhry is a 39 y.o. male transferred from [facility] for definitive management of a draining buttocks wound. Pt has had recurrent problems with this dating back 9 years. Current episode has been bothering him for the past 7-8 days. He is having fevers and chills and complains of severe pain in the buttocks area. He has been passing large amounts of blood and clots per rectum. He has a spontaneously draining left buttocks wound draining purosanguinous material.    Past Medical History:   Diagnosis Date    Gluteal abscess     Recurrent    Ill-defined condition     GOUT     Past Surgical History:   Procedure Laterality Date    HX OTHER SURGICAL  2016    Gluteal abscess I&D      History reviewed. No pertinent family history. Social History     Social History    Marital status: SINGLE     Spouse name: N/A    Number of children: N/A    Years of education: N/A     Social History Main Topics    Smoking status: Current Every Day Smoker     Packs/day: 1.00     Years: 15.00    Smokeless tobacco: Never Used    Alcohol use 24.0 oz/week     40 Cans of beer per week    Drug use: Yes     Special: Marijuana    Sexual activity: Not Asked     Other Topics Concern    None     Social History Narrative      Current Outpatient Prescriptions   Medication Sig Dispense Refill    aspirin (ASPIRIN) 325 mg tablet Take 975-1,300 mg by mouth every six (6) hours as needed for Pain. Allergies   Allergen Reactions    Penicillins Nausea and Vomiting    Shellfish Derived Shortness of Breath and Swelling       Review of Systems:  Pertinent items are noted in the History of Present Illness.     Objective:      Patient Vitals for the past 8 hrs:   BP Temp Pulse Resp SpO2 Height Weight   18 1001 - - - - 98 % - -   18 0945 137/90 98 °F (36.7 °C) (!) 110 17 97 % 6' 2\" (1.88 m) 179 lb 14.3 oz (81.6 kg)       Temp (24hrs), Av.3 °F (36.8 °C), Min:98 °F (36.7 °C), Max:98.5 °F (36.9 °C)      Physical Exam:  GENERAL: alert, cooperative, no distress, appears stated age, LUNG: clear to auscultation bilaterally, HEART: regular rate and rhythm, ABDOMEN: soft, non-tender. Bowel sounds normal. No masses,  no organomegaly, EXTREMITIES:  extremities normal, atraumatic, no cyanosis or edema, RECTAL: extensive blood on perineum around anal orifice. Induration and open wound left buttocks at gluteal cleft c/w tracking perirectal abscess. Assessment: This appears to be a complex perirectal abscess of longstanding duration, now with complication of significant bleeding. Plan:     Recommend colorectal surgery involvement. Will be happy to help with patient care if required.     Signed By: Dianne Portillo MD, [facility]    August 21, 2018

## 2018-08-21 NOTE — CONSULTS
Urology Consult    Subjective:     Date of Consultation:  August 21, 2018    Referring Physician: Aristides Lorenz    Reason for Consultation:  perianal abscess    Patient Name: Keiko Day  MRN: [id number]    History of Present Illness:   Patient is a 39 y.o.  male who is being seen for perianal abscess. He was admitted to the hospital for Sepsis [facility])  Perianal Abscess. the patient is in the ER. He states he has had multiple episodes 2 years with perianal or perineal or perirectal abscess with spontaneous drainage and occasional need for operative intervention. No voiding issues. For the first time with this perineal process he is noticing some scrotal swelling that has occurred in the last day or 2 after several days of the perineal issue. WBC   28.9  Cr   .9  U/A negative    CT  :    EXAM:  CT abdomen pelvis with contrast     INDICATION: Sacral and rectal abscess for 5 days.     COMPARISON: None.     TECHNIQUE: Helical CT of the abdomen  and pelvis  with oral  contrast following  the uneventful intravenous administration of nonionic contrast. Delayed axial CT  of the abdomen and pelvis is also performed. Coronal and sagittal reformats are  performed. CT dose reduction was achieved through use of a standardized  protocol tailored for this examination and automatic exposure control for dose  modulation.     FINDINGS:   There is a peripherally enhancing low density collection in the left gluteal  soft tissues measuring 2.3 x 12.0 cm. There is adjacent skin thickening and  subcutaneous fat stranding. There is an adjacent collection extending into the  base of the scrotum measuring 4.9 x 5.8 cm (series 2, image 102). Skin  thickening and underlying soft tissue inflammation also extends along the left  gluteal soft tissues (series 2, image 76).    The visualized lung bases demonstrate no mass or consolidation.  The heart size  is normal. There is no pericardial or pleural effusion.     The liver, spleen, pancreas, and adrenal glands are normal. The gall bladder is  present  without intra- or extra-hepatic biliary dilatation.       The kidneys are symmetric without hydronephrosis.      There are no dilated bowel loops. The appendix is normal.       There are no enlarged lymph nodes. There is no free fluid or free air. The  aorta tapers without aneurysm. There is mild aortoiliac atherosclerosis.     The urinary bladder is normal.  There is no pelvic mass. The prostate is not  enlarged.     The bony structures are age-appropriate.     IMPRESSION  IMPRESSION:   1. Fairly extensive inflammatory changes in the gluteal soft tissues with a soft  tissue abscess along the left aspect of the gluteal crease and extending to the  base of the scrotum.     2. There is no acute intraperitoneal abnormality. Past Medical History:   Diagnosis Date    Gluteal abscess     Recurrent    Ill-defined condition     GOUT      Past Surgical History:   Procedure Laterality Date    HX OTHER SURGICAL  2016    Gluteal abscess I&D      Family History   Problem Relation Age of Onset    Arthritis Mother     Heart Failure Mother     Hypertension Father       Social History   Substance Use Topics    Smoking status: Current Every Day Smoker     Packs/day: 1.00     Years: 15.00    Smokeless tobacco: Never Used    Alcohol use 24.0 oz/week     40 Cans of beer per week     Allergies   Allergen Reactions    Penicillins Nausea and Vomiting    Shellfish Derived Shortness of Breath and Swelling      Prior to Admission medications    Medication Sig Start Date End Date Taking? Authorizing Provider   aspirin (ASPIRIN) 325 mg tablet Take 975-1,300 mg by mouth every six (6) hours as needed for Pain.    Yes Phys MD Debo             Objective:     Data Review (Labs):    Recent Labs      08/21/18   0145  08/21/18   0115   WBC   --   28.9*   HGB   --   11.5*   PLT   --   616*   NA   --   137   K   --   3.2*   CREA   --   0.99   BUN   --   10   INR  1.0 --        Patient Vitals for the past 8 hrs:   BP Temp Pulse Resp SpO2 Height Weight   18 1130 (!) 128/107 - (!) 113 25 - - -   18 1100 150/90 - (!) 114 16 - - -   18 1030 149/88 - (!) 112 24 - - -   18 1001 - - - - 98 % - -   18 1000 (!) 148/99 - (!) 114 16 96 % - -   18 0945 137/90 98 °F (36.7 °C) (!) 110 17 97 % 6' 2\" (1.88 m) 81.6 kg (179 lb 14.3 oz)     Temp (24hrs), Av.3 °F (36.8 °C), Min:98 °F (36.7 °C), Max:98.5 °F (36.9 °C)      Intake and Output:        Physical Exam:            General:    awake, alert, complaining of pain in the left gluteal and perineal area                     Skin:  negative other than in area of perineum and left gluteal crease with some induration of scrotal skin    external HEENT unremarkable. Respiration without difficulty. No.             Abdomen[de-identified]  soft nontender             Genitalia:  No significant penile edema. He does have some scaling and induration extending from the perineal process up to the scrotum. I do not feel any evidence of fluctuance within the scrotum. Extremities:  No significant ankle edema noted. Assessment:     Active Problems:    Sepsis ([address] (2018)      Gluteal abscess (2018)      Scrotal abscess (2018)          Perirectal/perineal abscess    Plan:     General surgery note reviewed. Per patient he has been seen by colorectal surgery and plans are being made for incision and drainage of this perineal abscess. At this point scrotal skin appears viable. I do not appreciate any fluctuance in the scrotum. Suspect induration is extending to this area. We will plan to continue following with you.     Signed By: Oscar Quintero MD                         2018

## 2018-08-21 NOTE — PERIOP NOTES
Patient has eyebrow, nipple ring, and lip piercing unable to be removed. Jewelry consent signed. Piercings taped. Earring removed and given to Christy Heredia, patient's girlfriend.

## 2018-08-21 NOTE — PROGRESS NOTES
Pt. Admitted to unit approx 1330. Was given pain meds at 1630. IV infiltrated and was replaced. Pt was then give CHG bath at 1800 and transported to OR by bed.    Robbie Olea RN

## 2018-08-21 NOTE — IP AVS SNAPSHOT
[address] [facility] 
[phone] Patient: Thai Hawthorne MRN: [id number] About your hospitalization You were admitted on:  August 21, 2018 You last received care in the:  hospitals 2 GENERAL SURGERY You were discharged on:  August 29, 2018 Why you were hospitalized Your primary diagnosis was:  Not on File Your diagnoses also included:  Sepsis (Hcc), Gluteal Abscess, Scrotal Abscess Follow-up Information Follow up With Details Comments Contact Info None   None ([phone]) Patient stated that they have no PCP Maia Vincent MD In 1 week  [address] [facility] 
[phone] Lamarsolomoneusebio Kang 117 Discharge Orders None A check dianna indicates which time of day the medication should be taken. My Medications START taking these medications Instructions Each Dose to Equal  
 Morning Noon Evening Bedtime HYDROmorphone 4 mg tablet Commonly known as:  DILAUDID Your last dose was: Your next dose is: Take 1 Tab by mouth every four (4) hours as needed. Max Daily Amount: 24 mg.  
 4 mg CONTINUE taking these medications Instructions Each Dose to Equal  
 Morning Noon Evening Bedtime  
 aspirin 325 mg tablet Commonly known as:  ASPIRIN Your last dose was: Your next dose is: Take 975-1,300 mg by mouth every six (6) hours as needed for Pain. 975-1300 mg Where to Get Your Medications Information on where to get these meds will be given to you by the nurse or doctor. ! Ask your nurse or doctor about these medications HYDROmorphone 4 mg tablet Opioid Education  Prescription Opioids: What You Need to Know: 
 
Prescription opioids can be used to help relieve moderate-to-severe pain and are often prescribed following a surgery or injury, or for certain health conditions. These medications can be an important part of treatment but also come with serious risks. Opioids are strong pain medicines. Examples include hydrocodone, oxycodone, fentanyl, and morphine. Heroin is an example of an illegal opioid. It is important to work with your health care provider to make sure you are getting the safest, most effective care. WHAT ARE THE RISKS AND SIDE EFFECTS OF OPIOID USE? Prescription opioids carry serious risks of addiction and overdose, especially with prolonged use. An opioid overdose, often marked by slow breathing, can cause sudden death. The use of prescription opioids can have a number of side effects as well, even when taken as directed. · Tolerance-meaning you might need to take more of a medication for the same pain relief · Physical dependence-meaning you have symptoms of withdrawal when the medication is stopped. Withdrawal symptoms can include nausea, sweating, chills, diarrhea, stomach cramps, and muscle aches. Withdrawal can last up to several weeks, depending on which drug you took and how long you took it. · Increased sensitivity to pain · Constipation · Nausea, vomiting, and dry mouth · Sleepiness and dizziness · Confusion · Depression · Low levels of testosterone that can result in lower sex drive, energy, and strength · Itching and sweating RISKS ARE GREATER WITH:      
· History of drug misuse, substance use disorder, or overdose · Mental health conditions (such as depression or anxiety) · Sleep apnea · Older age (72 years or older) · Pregnancy Avoid alcohol while taking prescription opioids. Also, unless specifically advised by your health care provider, medications to avoid include: · Benzodiazepines (such as Xanax or Valium) · Muscle relaxants (such as Soma or Flexeril) · Hypnotics (such as Ambien or Lunesta) · Other prescription opioids KNOW YOUR OPTIONS Talk to your health care provider about ways to manage your pain that don't involve prescription opioids. Some of these options may actually work better and have fewer risks and side effects. Options may include: 
· Pain relievers such as acetaminophen, ibuprofen, and naproxen · Some medications that are also used for depression or seizures · Physical therapy and exercise · Counseling to help patients learn how to cope better with triggers of pain and stress. · Application of heat or cold compress · Massage therapy · Relaxation techniques Be Informed Make sure you know the name of your medication, how much and how often to take it, and its potential risks & side effects. IF YOU ARE PRESCRIBED OPIOIDS FOR PAIN: 
· Never take opioids in greater amounts or more often than prescribed. Remember the goal is not to be pain-free but to manage your pain at a tolerable level. · Follow up with your primary care provider to: · Work together to create a plan on how to manage your pain. · Talk about ways to help manage your pain that don't involve prescription opioids. · Talk about any and all concerns and side effects. · Help prevent misuse and abuse. · Never sell or share prescription opioids · Help prevent misuse and abuse. · Store prescription opioids in a secure place and out of reach of others (this may include visitors, children, friends, and family). · Safely dispose of unused/unwanted prescription opioids: Find your community drug take-back program or your pharmacy mail-back program, or flush them down the toilet, following guidance from the Food and Drug Administration (www.fda.gov/Drugs/ResourcesForYou). · Visit www.cdc.gov/drugoverdose to learn about the risks of opioid abuse and overdose. · If you believe you may be struggling with addiction, tell your health care provider and ask for guidance or call [facility] at 5-107-418-QTRO. Discharge Instructions Perineal Abscess: Care Instructions Your Care Instructions A perineal abscess is an infection that causes a painful lump in the perineum. The perineum is the area between the scrotum and the anus in a man. In a woman, it's the area between the vulva and the anus. The area may look red and feel painful and be swollen. The abscess may form after surgery or after delivery of a baby. It can also be caused by an infection of the prostate gland. The prostate gland is an organ found inside a man's body, just below the bladder. Your doctor may have done minor surgery to open and drain the abscess. You may have had a sedative to help you relax. You may be unsteady after having sedation. It can take a few hours for the medicine's effects to wear off. Common side effects include nausea, vomiting, and feeling sleepy or tired. The doctor has checked you carefully, but problems can develop later. If you notice any problems or new symptoms, get medical treatment right away. Follow-up care is a key part of your treatment and safety. Be sure to make and go to all appointments, and call your doctor if you are having problems. It's also a good idea to know your test results and keep a list of the medicines you take. How can you care for yourself at home? · If your doctor gave you a sedative: ¨ For 24 hours, don't do anything that requires attention to detail. It takes time for the medicine's effects to completely wear off. ¨ For your safety, do not drive or operate any machinery that could be dangerous. Wait until the medicine wears off. You need to be able to think clearly and react easily. · Be safe with medicines. Read and follow all instructions on the label. ¨ If the doctor gave you a prescription medicine for pain, take it as prescribed.  
¨ If you are not taking a prescription pain medicine, ask your doctor if you can take an over-the-counter medicine. · If your doctor prescribed antibiotics, take them as directed. Do not stop taking them just because you feel better. You need to take the full course of antibiotics. · Sit in a few inches of warm water (sitz bath) 3 times a day and after bowel movements. The warm water helps the area heal and eases discomfort. When should you call for help? Call 911 anytime you think you may need emergency care. For example, call if: 
  · You have trouble breathing.  
  · You passed out (lost consciousness).  
 Call your doctor now or seek immediate medical care if: 
  · You have symptoms of infection, such as: 
¨ Increased pain, swelling, warmth, or redness. ¨ Red streaks leading from the area. ¨ Pus draining from the area. ¨ A fever.  
 Watch closely for changes in your health, and be sure to contact your doctor if: 
  · You do not get better as expected. Where can you learn more? Go to http://rossana-isaiah.info/. Enter 96 [id number] in the search box to learn more about \"Perineal Abscess: Care Instructions. \" Current as of: May 12, 2017 Content Version: 11.7 © 1455-2326 [employer]. Care instructions adapted under license by [employer] (which disclaims liability or warranty for this information). If you have questions about a medical condition or this instruction, always ask your healthcare professional. [facility] any warranty or liability for your use of this information. Introducing [facility] [place] [facility]! Martin Brady introduces Take5 patient portal. Now you can access parts of your medical record, email your doctor's office, and request medication refills online. 1. In your internet browser, go to [email or web address] 2. Click on the First Time User? Click Here link in the Sign In box. You will see the New Member Sign Up page. 3. Enter your Life[employer] Access Code exactly as it appears below. You will not need to use this code after youve completed the sign-up process. If you do not sign up before the expiration date, you must request a new code. · Life[employer]antage Access Code: [email or web address] Expires: 11/19/2018 12:44 AM 
 
4. Enter the last four digits of your Social Security Number (xxxx) and Date of Birth (mm/dd/yyyy) as indicated and click Submit. You will be taken to the next sign-up page. 5. Create a LifeVantage ID. This will be your LifeVantage login ID and cannot be changed, so think of one that is secure and easy to remember. 6. Create a LifeVantage password. You can change your password at any time. 7. Enter your Password Reset Question and Answer. This can be used at a later time if you forget your password. 8. Enter your e-mail address. You will receive e-mail notification when new information is available in [address]. 9. Click Sign Up. You can now view and download portions of your medical record. 10. Click the Download Summary menu link to download a portable copy of your medical information. If you have questions, please visit the Frequently Asked Questions section of the LifeVantage website. Remember, LifeVantage is NOT to be used for urgent needs. For medical emergencies, dial 911. Now available from your iPhone and Android! Introducing Simón Martinez As a [employer] patient, I wanted to make you aware of our electronic visit tool called Simón Desramos. [employer] 24/7 allows you to connect within minutes with a medical provider 24 hours a day, seven days a week via a mobile device or tablet or logging into a secure website from your computer. You can access Simón Martinez from anywhere in the [place].  
 
A virtual visit might be right for you when you have a simple condition and feel like you just dont want to get out of bed, or cant get away from work for an appointment, when your regular [facility] provider is not available (evenings, weekends or holidays), or when youre out of town and need minor care. Electronic visits cost only $49 and if the [facility] [facility] 24/7 provider determines a prescription is needed to treat your condition, one can be electronically transmitted to a nearby pharmacy*. Please take a moment to enroll today if you have not already done so. The enrollment process is free and takes just a few minutes. To enroll, please download the [facility] [facility] 24/7 julián to your tablet or phone, or visit [email or web address] to enroll on your computer. And, as an [address] [place] [address] patient with a [employer] account, the results of your visits will be scanned into your electronic medical record and your primary care provider will be able to view the scanned results. We urge you to continue to see your regular [facility] provider for your ongoing medical care. And while your primary care provider may not be the one available when you seek a Busap virtual visit, the peace of mind you get from getting a real diagnosis real time can be priceless. For more information on Busap, view our Frequently Asked Questions (FAQs) at [email or web address]. Sincerely, 
 
Kathya Olea MD 
Chief Medical Officer [address] [place] [address] *:  certain medications cannot be prescribed via Busap Providers Seen During Your Hospitalization Provider Specialty Primary office phone Orlando Gonzalez MD Emergency Medicine [phone] Isra Queen MD Colon and Rectal Surgery [phone] Your Primary Care Physician (PCP) Primary Care Physician Office Phone Office Fax NONE ** None ** ** None ** You are allergic to the following Allergen Reactions Penicillins Nausea and Vomiting Shellfish Derived Shortness of Breath Swelling Recent Documentation Height Weight BMI Smoking Status 1.88 m 81.6 kg 23.1 kg/m2 Current Every Day Smoker Emergency Contacts Name Discharge Info Relation Home Work Mobile [address] CAREGIVER [3] Girlfriend [18] [phone] Patient Belongings The following personal items are in your possession at time of discharge: 
  Dental Appliances: None  Visual Aid: Glasses, At bedside Please provide this summary of care documentation to your next provider. Signatures-by signing, you are acknowledging that this After Visit Summary has been reviewed with you and you have received a copy. Patient Signature:  ____________________________________________________________ Date:  ____________________________________________________________  
  
[facility] Signature:  ____________________________________________________________ Date:  ____________________________________________________________

## 2018-08-22 LAB
ABO + RH BLD: NORMAL
ALBUMIN SERPL-MCNC: 1.9 G/DL (ref 3.5–5)
ALBUMIN/GLOB SERPL: 0.4 {RATIO} (ref 1.1–2.2)
ALP SERPL-CCNC: 69 U/L (ref 45–117)
ALT SERPL-CCNC: 13 U/L (ref 12–78)
ANION GAP SERPL CALC-SCNC: 6 MMOL/L (ref 5–15)
AST SERPL-CCNC: 12 U/L (ref 15–37)
BASOPHILS # BLD: 0 K/UL (ref 0–0.1)
BASOPHILS NFR BLD: 0 % (ref 0–1)
BILIRUB SERPL-MCNC: 0.3 MG/DL (ref 0.2–1)
BLOOD GROUP ANTIBODIES SERPL: NORMAL
BUN SERPL-MCNC: 7 MG/DL (ref 6–20)
BUN/CREAT SERPL: 11 (ref 12–20)
CALCIUM SERPL-MCNC: 7.9 MG/DL (ref 8.5–10.1)
CHLORIDE SERPL-SCNC: 106 MMOL/L (ref 97–108)
CO2 SERPL-SCNC: 26 MMOL/L (ref 21–32)
CREAT SERPL-MCNC: 0.65 MG/DL (ref 0.7–1.3)
DIFFERENTIAL METHOD BLD: ABNORMAL
EOSINOPHIL # BLD: 0 K/UL (ref 0–0.4)
EOSINOPHIL NFR BLD: 0 % (ref 0–7)
ERYTHROCYTE [DISTWIDTH] IN BLOOD BY AUTOMATED COUNT: 13.2 % (ref 11.5–14.5)
GLOBULIN SER CALC-MCNC: 5 G/DL (ref 2–4)
GLUCOSE SERPL-MCNC: 174 MG/DL (ref 65–100)
HCT VFR BLD AUTO: 27.8 % (ref 36.6–50.3)
HGB BLD-MCNC: 8.9 G/DL (ref 12.1–17)
IMM GRANULOCYTES # BLD: 0.3 K/UL (ref 0–0.04)
IMM GRANULOCYTES NFR BLD AUTO: 1 % (ref 0–0.5)
INR PPP: 1.1 (ref 0.9–1.1)
LYMPHOCYTES # BLD: 0.7 K/UL (ref 0.8–3.5)
LYMPHOCYTES NFR BLD: 3 % (ref 12–49)
MAGNESIUM SERPL-MCNC: 1.8 MG/DL (ref 1.6–2.4)
MCH RBC QN AUTO: 29.4 PG (ref 26–34)
MCHC RBC AUTO-ENTMCNC: 32 G/DL (ref 30–36.5)
MCV RBC AUTO: 91.7 FL (ref 80–99)
MONOCYTES # BLD: 0.5 K/UL (ref 0–1)
MONOCYTES NFR BLD: 2 % (ref 5–13)
NEUTS SEG # BLD: 23.4 K/UL (ref 1.8–8)
NEUTS SEG NFR BLD: 94 % (ref 32–75)
NRBC # BLD: 0 K/UL (ref 0–0.01)
NRBC BLD-RTO: 0 PER 100 WBC
PLATELET # BLD AUTO: 464 K/UL (ref 150–400)
PMV BLD AUTO: 9 FL (ref 8.9–12.9)
POTASSIUM SERPL-SCNC: 3.8 MMOL/L (ref 3.5–5.1)
PROT SERPL-MCNC: 6.9 G/DL (ref 6.4–8.2)
PROTHROMBIN TIME: 11.7 SEC (ref 9–11.1)
RBC # BLD AUTO: 3.03 M/UL (ref 4.1–5.7)
RBC MORPH BLD: ABNORMAL
SODIUM SERPL-SCNC: 138 MMOL/L (ref 136–145)
SPECIMEN EXP DATE BLD: NORMAL
WBC # BLD AUTO: 24.9 K/UL (ref 4.1–11.1)

## 2018-08-22 PROCEDURE — 83735 ASSAY OF MAGNESIUM: CPT | Performed by: SURGERY

## 2018-08-22 PROCEDURE — 74011250637 HC RX REV CODE- 250/637: Performed by: INTERNAL MEDICINE

## 2018-08-22 PROCEDURE — 86900 BLOOD TYPING SEROLOGIC ABO: CPT | Performed by: INTERNAL MEDICINE

## 2018-08-22 PROCEDURE — 85610 PROTHROMBIN TIME: CPT | Performed by: INTERNAL MEDICINE

## 2018-08-22 PROCEDURE — 80053 COMPREHEN METABOLIC PANEL: CPT | Performed by: SURGERY

## 2018-08-22 PROCEDURE — 74011250636 HC RX REV CODE- 250/636: Performed by: SURGERY

## 2018-08-22 PROCEDURE — 36415 COLL VENOUS BLD VENIPUNCTURE: CPT | Performed by: SURGERY

## 2018-08-22 PROCEDURE — 74011250636 HC RX REV CODE- 250/636: Performed by: INTERNAL MEDICINE

## 2018-08-22 PROCEDURE — 85025 COMPLETE CBC W/AUTO DIFF WBC: CPT | Performed by: SURGERY

## 2018-08-22 PROCEDURE — 65270000029 HC RM PRIVATE

## 2018-08-22 RX ORDER — LEVOFLOXACIN 5 MG/ML
750 INJECTION, SOLUTION INTRAVENOUS EVERY 24 HOURS
Status: COMPLETED | OUTPATIENT
Start: 2018-08-23 | End: 2018-08-27

## 2018-08-22 RX ORDER — LORAZEPAM 2 MG/ML
4 INJECTION INTRAMUSCULAR
Status: DISCONTINUED | OUTPATIENT
Start: 2018-08-22 | End: 2018-08-29 | Stop reason: HOSPADM

## 2018-08-22 RX ORDER — LORAZEPAM 2 MG/ML
2 INJECTION INTRAMUSCULAR
Status: DISCONTINUED | OUTPATIENT
Start: 2018-08-22 | End: 2018-08-29 | Stop reason: HOSPADM

## 2018-08-22 RX ADMIN — ONDANSETRON 4 MG: 2 INJECTION, SOLUTION INTRAMUSCULAR; INTRAVENOUS at 09:51

## 2018-08-22 RX ADMIN — OXYCODONE HYDROCHLORIDE 5 MG: 5 TABLET ORAL at 22:03

## 2018-08-22 RX ADMIN — METRONIDAZOLE 500 MG: 500 INJECTION, SOLUTION INTRAVENOUS at 16:41

## 2018-08-22 RX ADMIN — VANCOMYCIN HYDROCHLORIDE 1000 MG: 1 INJECTION, POWDER, LYOPHILIZED, FOR SOLUTION INTRAVENOUS at 05:37

## 2018-08-22 RX ADMIN — OXYCODONE HYDROCHLORIDE 5 MG: 5 TABLET ORAL at 13:47

## 2018-08-22 RX ADMIN — VANCOMYCIN HYDROCHLORIDE 1000 MG: 1 INJECTION, POWDER, LYOPHILIZED, FOR SOLUTION INTRAVENOUS at 12:23

## 2018-08-22 RX ADMIN — METRONIDAZOLE 500 MG: 500 INJECTION, SOLUTION INTRAVENOUS at 02:34

## 2018-08-22 RX ADMIN — ONDANSETRON 4 MG: 2 INJECTION, SOLUTION INTRAMUSCULAR; INTRAVENOUS at 02:34

## 2018-08-22 RX ADMIN — LEVOFLOXACIN 500 MG: 5 INJECTION, SOLUTION INTRAVENOUS at 05:30

## 2018-08-22 RX ADMIN — PANTOPRAZOLE SODIUM 40 MG: 40 TABLET, DELAYED RELEASE ORAL at 06:40

## 2018-08-22 RX ADMIN — OXYCODONE HYDROCHLORIDE 5 MG: 5 TABLET ORAL at 05:35

## 2018-08-22 RX ADMIN — LORAZEPAM 2 MG: 2 INJECTION INTRAMUSCULAR; INTRAVENOUS at 13:47

## 2018-08-22 RX ADMIN — OXYCODONE HYDROCHLORIDE 5 MG: 5 TABLET ORAL at 09:35

## 2018-08-22 RX ADMIN — VANCOMYCIN HYDROCHLORIDE 1000 MG: 1 INJECTION, POWDER, LYOPHILIZED, FOR SOLUTION INTRAVENOUS at 21:51

## 2018-08-22 RX ADMIN — SODIUM CHLORIDE 100 ML/HR: 900 INJECTION, SOLUTION INTRAVENOUS at 12:32

## 2018-08-22 RX ADMIN — HYDROMORPHONE HYDROCHLORIDE 1 MG: 2 INJECTION, SOLUTION INTRAMUSCULAR; INTRAVENOUS; SUBCUTANEOUS at 09:51

## 2018-08-22 NOTE — PROGRESS NOTES
Orders received, chart reviewed. Pt declines therapy at this time reporting pain was 20 now post wound care. A Specialty cushion was obtained with wound care nurse's assistance for future positioning in sitting once OOB and mobilizing. Discussed with pt re: timing of therapy (pain medication and wound care schedule) and goals of therapy. Pt requesting that therapy not occur after daily wound care, stating that afternoons may be better as wound care nurses plan to do wound care in the mornings. Given that pt is  Independent at baseline, his therapy needs will likely be limited. Educated on benefits of mobility however pt declined secondary to increased pain levels. Will defer however continue to follow.     Memo Duarte, PT, DPT

## 2018-08-22 NOTE — PROGRESS NOTES
Spiritual Care Partner Volunteer visited patient in [place] on 8/22/2018. Documented by:  PRANAV Bianchi

## 2018-08-22 NOTE — ROUTINE PROCESS
Patient returned from OR at the change of shift. Bedside and Verbal shift change report given to [facility] (oncoming nurse) by Jaguar Junior RN (offgoing nurse). Report given with SBAR, Rohan, MAR and Recent Results.

## 2018-08-22 NOTE — PERIOP NOTES
TRANSFER - OUT REPORT:    Verbal report given to [address] RN(name) on [facility]  being transferred to 2107(unit) for routine progression of care       Report consisted of patients Situation, Background, Assessment and   Recommendations(SBAR). Information from the following report(s) OR Summary, Intake/Output and MAR was reviewed with the receiving nurse. Opportunity for questions and clarification was provided.       Patient transported with:   O2 @ 2 liters  Registered Nurse

## 2018-08-22 NOTE — PROGRESS NOTES
Initial Nutrition Assessment:    INTERVENTIONS/RECOMMENDATIONS:   · Meals/Snacks: General/healthful diet:  Continue regular, liberalized diet without protein limitations. Enc po and select meals for optimal nutritional intake. · Supplements: Commercial supplement:  RD increased Ensure Enlive shakes from BID to TID. At 100% intake, will provide daily approx. 1050 kcals/60 g protein. Remaining nutritional needs will need to be supplied by meal intake. ASSESSMENT:   8/22:  Chart reviewed; med noted for sepsis and perianal abscess on admission. RD received nutrition consult from wound care with request for ensure shakes. Pt admitted on a regular diet then changed to a regular with 40 g protein restriction. However, pt has increased energy/protein needs to support wound healing. Ensure shakes were also ordered per pt request as pt did not want breakfast tray. Attempted to visit with pt at bedside, but pt sleeping soundly. Diet Order: Regular  % Eaten:  Patient Vitals for the past 72 hrs:   % Diet Eaten   08/22/18 0834 0 %   08/21/18 2340 0 %     Pertinent Medications: [x]Reviewed []Other: miralax, levaquin, protonix  Pertinent Labs: [x]Reviewed []Other  Food Allergies: []None [x]Other: shellfish   Last BM:    [x]Active     []Hyperactive  []Hypoactive       [] Absent BS  Skin:    [] Intact   [] Incision  [x] Breakdown  [x] Other: chronic perianal abscess, s/p I&D    Anthropometrics:   Height: 6' 2\" (188 cm) Weight: 81.6 kg (179 lb 14.3 oz)   IBW (%IBW):   ( ) UBW (%UBW):   (  %)   Last Weight Metrics:  Weight Loss Metrics 8/21/2018 8/21/2018   Today's Wt 179 lb 14.3 oz 180 lb   BMI 23.1 kg/m2 23.11 kg/m2       BMI: Body mass index is 23.1 kg/(m^2). This BMI is indicative of:   []Underweight    [x]Normal    []Overweight    [] Obesity   [] Extreme Obesity (BMI>40)     Estimated Nutrition Needs (Based on):   2308 Kcals/day (BMR (1776) x 1. 3AF) , 98 g (1.2 g/kg bw) Protein  Carbohydrate:  At Least 130 g/day Fluids: 2300 mL/day (1ml/kcal)    NUTRITION DIAGNOSES:   Problem:  Inadequate oral intake      Etiology: related to suspected decreased appetite     Signs/Symptoms: as evidenced by requested ensure shakes with meals, increased wound healing needs      NUTRITION INTERVENTIONS:  Meals/Snacks: General/healthful diet   Supplements: Commercial supplement              GOAL:   PO intake trend >50% of meals + consume 480 ml Ensure daily next 3-5 days    LEARNING NEEDS (Diet, Food/Nutrient-Drug Interaction):    [x] None Identified   [] Identified and Education Provided/Documented   [] Identified and Pt declined/was not appropriate     Cultureal, Voodoo, OR Ethnic Dietary Needs:    [x] None Identified   [] Identified and Addressed     [x] Interdisciplinary Care Plan Reviewed/Documented    [x] Discharge Planning: Continue regular diet + Ensure Plus/Enlive shakes 2-3 times daily      MONITORING /EVALUATION:   Food/Nutrient Intake Outcomes:  Total energy intake, Protein intake  Physical Signs/Symptoms Outcomes: Weight/weight change    NUTRITION RISK:    [x] Patient At Nutritional Risk    [] High              [x] Moderate/Mild           [x]  Low     [] Patient Not At Nutritional Risk    PT SEEN FOR:    [x]  MD Consult: []Calorie Count      []Diabetic Diet Education        []Diet Education     []Electrolyte Management     [x]General Nutrition Management and Supplements     []Management of Tube Feeding     []TPN Recommendations    []  RN Referral:  []MST score >=2     []Enteral/Parenteral Nutrition PTA     []Pregnant: Gestational DM or Multigestation     []Pressure Ulcer/Wound Care needs        []  Low BMI  []  SVETLANA Pantoja, [address]  Pager [phone]  Weekend Pager [phone]

## 2018-08-22 NOTE — ROUTINE PROCESS
Bedside and Verbal shift change report given to Riana Tate RN (oncoming nurse) by Adenike Locke RN (offgoing nurse). Report given with SBAR, Kardex, MAR and Recent Results.

## 2018-08-22 NOTE — ANESTHESIA POSTPROCEDURE EVALUATION
Post-Anesthesia Evaluation and Assessment    Patient: Derek Lion MRN: [id number]  SSN: [id number]    YOB: 1975  Age: 39 y.o. Sex: male       Cardiovascular Function/Vital Signs  Visit Vitals    BP (!) 167/94    Pulse (!) 112    Temp 36.6 °C (97.9 °F)    Resp 24    Ht 6' 2\" (1.88 m)    Wt 81.6 kg (179 lb 14.3 oz)    SpO2 100%    BMI 23.1 kg/m2       Patient is status post general anesthesia for Procedure(s):  INCISION AND DRAINAGE PERIANAL ABSCESS , flex cystoscopy. Nausea/Vomiting: None    Postoperative hydration reviewed and adequate. Pain:  Pain Scale 1: Numeric (0 - 10) (08/21/18 2200)  Pain Intensity 1: 0 (08/21/18 2200)   Managed    Neurological Status:   Neuro (WDL): Exceptions to WDL (08/21/18 2144)  Neuro  Neurologic State: Drowsy; Eyes open spontaneously;Sleeping (08/21/18 2144)  Orientation Level: Oriented to person;Oriented to place;Oriented to situation (08/21/18 2144)  Cognition: Follows commands (08/21/18 2144)  Speech: Delayed responses (08/21/18 2144)  LUE Motor Response: Purposeful (08/21/18 2144)  LLE Motor Response: Purposeful (08/21/18 2144)  RUE Motor Response: Purposeful (08/21/18 2144)  RLE Motor Response: Purposeful (08/21/18 2144)   At baseline    Mental Status and Level of Consciousness: Arousable    Pulmonary Status:   O2 Device: Room air (08/21/18 2202)   Adequate oxygenation and airway patent    Complications related to anesthesia: None    Post-anesthesia assessment completed.  No concerns    Signed By: Nacho Kyle MD     August 21, 2018

## 2018-08-22 NOTE — OP NOTES
[address]  OPERATIVE REPORT    Elo Lai  MR#: [id number]  : 1975  ACCOUNT #: [de-identified]   DATE OF SERVICE: 2018    PREOPERATIVE DIAGNOSIS:  Perianal abscess. POSTOPERATIVE DIAGNOSIS:  Perirectal abscess with necrotic soft tissue along the base of the scrotum. PROCEDURE PERFORMED:  Incision and drainage of perirectal abscess, rigid sigmoidoscopy, flexible cystoscopy, excisional debridement of necrotic tissue. SURGEON:  Valdez Gomez MD, as well as Gurpreet Bloom MD.    ANESTHESIA:  General.    ESTIMATED BLOOD LOSS:  20 mL. SPECIMENS REMOVED:  Necrotic dartos tissue under the scrotum as well as Gram stain and culture of purulent tissue. FINDINGS:  A large amount of pus extending in the base of the scrotum all the way into the scrotal sac itself. A large amount of clots were expressed as well. This was also extending down to the left ischiorectal fossa. COMPLICATIONS:  None. IMPLANTS:  None. INDICATIONS:  This is a 41-year-old male who presented from [facility] with a perirectal abscess. He has a history of perirectal abscesses, one of which was drained about 10 years ago in [place], one of which was drained 1-1/2 years ago at [facility].  He stated this has been an ongoing issue for quite some time and it has worsened over the last several days. He had a CT scan that was done at [facility].  Unfortunately, I do not have access to the images, nor did he come with a CD; however, the reading from the radiologist at [facility] dictates a 12 cm abscess in the left gluteal soft tissue space with extension of a 5.8 cm abscess at the base of the scrotum. As such, due to his multiple history of abscesses, I felt it would be prudent to place him in the prone jackknife position to try to identify an internal opening.   I explained to him all the risks and benefits of the procedure, including but not limited to bleeding, infection, need for recurrent surgery, damage to surrounding tissue. He understood the risks and elected to proceed. DESCRIPTION OF PROCEDURE:  The patient was brought to the operating suite. He was placed in the supine position. General endotracheal anesthesia was induced. Venodyne stockings were placed. He was then placed in the prone jackknife position. His buttocks were taped apart. His perianal area was prepped and draped in the usual sterile fashion. Timeout was performed indicating the correct patient and procedure to be performed as per hospital protocol. I began by examining the left ischiorectal fossa. There was a small amount of purulent drainage that was expressed from a very thickened area of skin. I was able to open this up and a moderate amount of pus was expressed from this opening; however, it was noted that a  significant portion of this abscess cavity was actually extending anteriorly and, although the CT scan dictated a 12 cm abscess along the left gluteal soft tissue, it appeared that the majority of this was not infecting the left gluteal tissue but, in fact, much more anteriorly and actually extending into the scrotal sac. Upon probing the tissue integrity of the base of the scrotum, it  was so poor that the skin completely broke down with just simple palpation. An anoscope was placed, looking into the anal canal.  No internal openings could be identified. A rigid sigmoidoscopy was also utilized to fully examine the rectum. This was advanced to 10 cm. The entire rectum was examined, and I was unable to identify any inflammation or internal openings that would suggest a cause or etiology of this abscess.    In light of the fact that the majority of his inflammation was anteriorly and actually going into the scrotal sac, I called Dr. Jonelle Marshall to come and assist.  While we were waiting for him, we felt it was best to place him in the lithotomy position since most of the inflammation was actually anteriorly, which was not indicated by the CT scan reading. The patient was then placed in the lithotomy position while remaining intubated. His perineum was reprepped with Betadine. This allowed me to better assess the anterior space and see the base of the scrotum. He was noted to have a punctate lesion in the scrotal sac with grayish ischemic tissue along the perineum. I filleted open this area. The skin did not bleed, indicating that this was, in fact, necrotic tissue. Wound cultures were obtained from the fluid emanating out of the scrotal sac. A large amount of pus and clots were expressed from the scrotal sac and this did, in fact, communicate the perineal opening. Once this was completely filleted open, Dr. Yasmeen Morrow arrived and was able to assist in further debridement and sent off further tissue for pathologic examination. He also performed a flexible cystoscopy For more details of his procedure, please refer to his operative note. After he completed, a Lerma catheter was placed, and the wound was packed with half-strength Betadine in a Kerlix. The Kerlix was packed in all of the areas of the cavity followed by dry dressing and tape. The patient was awakened and taken to recovery room in stable condition.       Jaja Dior M.D.       Elida Paul / Nahomy Washington  D: 08/21/2018 21:24     T: 08/22/2018 06:49  JOB #: [id number]

## 2018-08-22 NOTE — ROUTINE PROCESS
TRANSFER - IN REPORT:    Verbal report received from Frances Lomax RN(name) [facility] received from OR(unit) for routine post - op      Report consisted of patients Situation, Background, Assessment and   Recommendations(SBAR). Information from the following report(s) OR Summary was reviewed with the receiving nurse. Opportunity for questions and clarification was provided. Assessment completed upon patients arrival to unit and care assumed.

## 2018-08-22 NOTE — BRIEF OP NOTE
BRIEF OPERATIVE NOTE    Date of Procedure: 8/21/2018   Preoperative Diagnosis: Perianal Abscess  Postoperative Diagnosis: Perirectal Abscess with necrotic soft tissue along the base of the scrotum. Procedure(s):  INCISION AND DRAINAGE PERIRECTAL ABSCESS, RIGID SIGMOIDOSCOPY, FLEXIBLE CYSTOSCOPY, EXCISIONAL DEBRIDEMENT OF NECROTIC TISSUE  Surgeon(s) and Role:     * Rubia Reyna MD - Primary     * C Danna Elkins MD - Assisting           Surgical Staff:  Circ-1: Delmi Reyes RN  Circ-Relief: Rick Moreno RN  Scrub Tech-1: [employer]  Event Time In   Incision Start 1931   Incision Close 2115     Anesthesia: General   Estimated Blood Loss: 20ml  Specimens:   ID Type Source Tests Collected by Time Destination   1 : necrotic dartos tissue under scrotum Preservative Scrotum  Rubia Reyna MD 8/21/2018 2102 Pathology   1 : under scrotal wound Wound  ANAEROBIC/AEROBIC/GRAM STAIN Rubia Reyna MD 8/21/2018 2041 Microbiology   2 : necrotic dartos tissue Wound  ANAEROBIC/AEROBIC/GRAM STAIN Rubia Reyna MD 8/21/2018 2054 Microbiology      Findings: Necrotic tissue down to the level of the muscle fascia.   Not involving muscle or bones   Complications: None  Implants: * No implants in log *

## 2018-08-22 NOTE — OP NOTES
[facility]  OPERATIVE REPORT    Shanna Santos  MR#: [id number]  : 1975  ACCOUNT #: [de-identified]   DATE OF SERVICE: 2018    PREOPERATIVE DIAGNOSIS:  Perirectal abscess, involving inferior scrotum. POSTOPERATIVE DIAGNOSIS:  Perirectal abscess involving inferior scrotum. PROCEDURE PERFORMED:  Exploration of scrotal wound and debridement of a small amount of nonviable tissue, flexible cystoscopy. SURGEON:  Alejandro Edwards MD    ASSISTANT:  Miley Fierro MD    ANESTHESIA:  General.    DRAINS OR TUBES: 16-Vietnamese Lerma catheter. SPECIMENS REMOVED:  1. Nonviable tissue from the left inferior scrotum for culture. 2.  Nonviable tissue from the right inferior scrotum for pathologic analysis. ESTIMATED BLOOD LOSS:  Minimal.    COMPLICATIONS:  None apparent. INDICATIONS:  Patient is a 77-year-old male transferred from [facility] to [facility] for a perirectal abscess. Dr. Torsten Bonilla had the patient in the operating room for drainage of the abscess and noted that it was tracking toward the scrotum. He asked for an intraoperative consultation. PROCEDURE IN DETAIL:  Upon arrival in the operating room, the patient was in the lithotomy position. The perirectal abscess extending into the perineum had been drained and was open. At the inferior most aspect of the scrotum where it meets the perineum one could palpate continuation of the abscess into the inferior scrotum on the left and right sides. There was a small pinhole opening in the skin where it appeared that there had been some previous drainage. I connected the larger part of the wound with this pinhole opening such that I could explore the dartos fascial layer more readily. The testicles were still contained within the dartos fascia and the dartos fascia was intact.   There was a small amount of nonviable tissue in the subcutaneous layer on the left and right side, which I debrided and sent for specimens as outlined above. I used the Bovie on one small area of slow oozing. There did not appear to be any further involvement of the upper scrotum or scrotal contents. Penis is uncircumcised and there was some degree of phimosis although foreskin was retractable enough to see the meatus. This had been previously prepped into the field. I passed a flexible cystoscope into the urethra and all the way into the bladder. The anterior and posterior urethra were within normal limits. Prostatic urethra was only about a 1.5 cm in length and did not appear obstructing. Bladder itself was clean and without evidence of lesion. Retroflexing the scope toward the bladder neck showed no pathology. I then removed the cystoscope and this concluded my portion of the procedure. I believe the inferior most scrotum could be packed with the remainder of the open wound as it is contiguous. I do not feel there was any further nonviable tissue to debride. Genna Phoenix, MD CRB / BRYON  D: 08/21/2018 21:52     T: 08/21/2018 22:11  JOB #: [id number]  CC: Isaac MAURO

## 2018-08-22 NOTE — PROGRESS NOTES
Occupational Therapy  Orders received and medical record reviewed. Spoke with nursing, wound care nurse (who'd just performed pt's daily post surgical care) and PT, re: pt's plan of care and modifications needed for mobility/comfort. Pt declines therapy at this time reporting pain was 20 now 11 post wound care. A Specialty cushion was obtained with wound care nurse's assistance for future positioning in sitting once OOB and mobilizing. Discussed with pt re: timing of therapy (pain medication and wound care schedule) and goals of therapy. Given that pt is  Independent at baseline, his therapy needs will likely be limited. Educated on benefits of mobility. Pt observed to be perspiring and mildly tremulous (pt holding his crossed arms on abdomen at bed level.) while positioned in supine with HOB elevated to less than 45 degrees. Informed nursing. Will defer initiating OT Evaluation as pt declines due to pain and continue to follow as appropriate. Total time 20 minutes.

## 2018-08-22 NOTE — H&P
History and Physical    Patient: Jada Estrada MRN: [id number]  SSN: [id number]    YOB: 1975  Age: 39 y.o. Sex: male      Subjective:      Jada Estrada is a 39 y.o. male who presents with a perirectal/scrotal abscess. He had 2 prior I&Ds of perirectal abscesses. Once was in [place] about 10 years ago and the most recent was at [facility] 1.5 years ago. He is not a diabetic. No other significant PMH        Past Medical History:   Diagnosis Date    Gluteal abscess     Recurrent    Ill-defined condition     GOUT     Past Surgical History:   Procedure Laterality Date    HX OTHER SURGICAL  2016    Gluteal abscess I&D      Family History   Problem Relation Age of Onset    Arthritis Mother     Heart Failure Mother     Hypertension Father      Social History   Substance Use Topics    Smoking status: Current Every Day Smoker     Packs/day: 1.00     Years: 15.00    Smokeless tobacco: Never Used    Alcohol use 24.0 oz/week     40 Cans of beer per week      Prior to Admission medications    Medication Sig Start Date End Date Taking? Authorizing Provider   aspirin (ASPIRIN) 325 mg tablet Take 975-1,300 mg by mouth every six (6) hours as needed for Pain. Yes Phys Other, MD        Allergies   Allergen Reactions    Penicillins Nausea and Vomiting    Shellfish Derived Shortness of Breath and Swelling       Review of Systems:  A comprehensive review of systems was negative except for that written in the History of Present Illness. Objective:     Vitals:    08/21/18 1300 08/21/18 1349 08/21/18 1537 08/21/18 1836   BP: 137/86 (!) 157/95 139/81 143/72   Pulse: (!) 112 (!) 115 (!) 105 (!) 111   Resp: 17 20 18 18   Temp: 99.3 °F (37.4 °C) 98.6 °F (37 °C) 98.3 °F (36.8 °C) 99 °F (37.2 °C)   SpO2: 95% 97% 98% 96%   Weight:       Height:            Physical Exam:  General:  Alert, cooperative, no distress, appears stated age. Eyes:  Conjunctivae/corneas clear. PERRL, EOMs intact.  Fundi benign Ears:  Normal TMs and external ear canals both ears. Nose: Nares normal. Septum midline. Mucosa normal. No drainage or sinus tenderness. Mouth/Throat: Lips, mucosa, and tongue normal. Teeth and gums normal.   Neck: Supple, symmetrical, trachea midline, no adenopathy, thyroid: no enlargment/tenderness/nodules, no carotid bruit and no JVD. Back:   Symmetric, no curvature. ROM normal. No CVA tenderness. Lungs:   Clear to auscultation bilaterally. Heart:  Tachycardia S1, S2 normal, no murmur, click, rub or gallop. Abdomen:   Soft, non-tender. Bowel sounds normal. No masses,  No organomegaly. Extremities: Extremities normal, atraumatic, no cyanosis or edema. Pulses: 2+ and symmetric all extremities. Skin: Skin color, texture, turgor normal. No rashes or lesions   Lymph nodes: Cervical, supraclavicular, and axillary nodes normal.   Neurologic: CNII-XII intact. Normal strength, sensation and reflexes throughout. Rectal exam - large area of indurated thick skin along left perirectal margin.   Significant scrotal swelling      Assessment:     Hospital Problems  Date Reviewed: 8/21/2018          Codes Class Noted POA    [facility] ICD-10-CM: A41.9  ICD-9-CM: 038.9, 995.91  8/21/2018 Yes        Gluteal abscess ICD-10-CM: L02.31  ICD-9-CM: 682.5  8/21/2018 Yes        Scrotal abscess ICD-10-CM: N49.2  ICD-9-CM: 608.4  8/21/2018 Yes              Plan:     OR for I&D of perirectal abscess    Signed By: Rubia Reyna MD     August 21, 2018

## 2018-08-22 NOTE — ROUTINE PROCESS
TRANSFER - IN REPORT:    Verbal report received from Isabel Saunders RN on Thai Hawthorne  being received from PACU for routine post - op      Report consisted of patients Situation, Background, Assessment and   Recommendations(SBAR). Information from the following report(s) SBAR, Kardex, OR Summary, Procedure Summary, Intake/Output, MAR and Recent Results was reviewed with the receiving nurse. Opportunity for questions and clarification was provided. Assessment completed upon patients arrival to unit and care assumed.

## 2018-08-22 NOTE — PROGRESS NOTES
Hemodynamically stable overnight. Wound examined earlier by wound care nurse. Scrotum unchanged and without new erythema or purulent drainage.   WBC 25, Hgb 8.9, Creatinine 0.65, Gm stain with few gm pos cocci  Impression: perirectal and perineal abscess with involvement of inferior scrotum, now POD#1 s/p I & D  Recs:  -Continue wound care and broad spectrum antibiotics  -Will check on his progress tomorrow and/or Friday, available sooner with questions or new issues

## 2018-08-22 NOTE — WOUND CARE
Wound Care consult: chart reviewed and patient assessed for his open incision / surgical wound s/p Incision and drainage and deep debridement of the scrotum and perineal region. The patient has been \"dealing with sores for 8-10 years\". The last Incision and drainage was done about 1.5 years ago at [facility] in [facility]. When asked about Hidradenitis (because he has s/sx of it on the skin), pt. Stated that he had never been diagnosed with anything like that but \"[place] physicians\". Patient presented to the ED with sepsis upon transfer from [facility] due to the need for surgical intervention immediately. He has Cara's Gangrene. Assessment and Treatment today: First post op wound care. Nursing staff pre-medicated for current and anticipatory pain of wound care. The packing was wet with Dakin Solution and then the packing removed from the wound in several directions. The buttocks wounds that were \"unroofed\" during surgery were bleeding a lot. They were not packed during surgery but are now packed with Dakin solution moistened packing strip x 2 wounds and approximately 8-10 inches of packing each. The scrotum was cleansed with Dakins and then packed with Kerlix gauze. The packing was covered with absorbent pads and then the post op shorts were used to keep the dressing in place. WOUND POA CONDITIONS    Wound Buttocks Right;Left (Active) x 2 packable wounds.    DRESSING STATUS Removed;Dry 8/22/2018 10:41 AM   DRESSING TYPE Mesh pants;4 x 4;ABD pad 8/22/2018  8:46 AM   Non-Pressure Injury Full thickness (subcut/muscle) 8/22/2018 10:41 AM   Condition of Base [place] 8/22/2018 10:41 AM   Condition of Edges Rolled/curled 8/22/2018 10:41 AM   Tissue Type Pink 8/22/2018 10:41 AM   Tissue Type Percent Pink 100 8/22/2018 10:41 AM   Direction of Tunnels 12  oclock 8/22/2018 10:41 AM   Depth of Tunnel/Sinus (cm) 6 cm 8/22/2018 10:41 AM Drainage Amount  Large 8/22/2018 10:41 AM   Drainage Color Dark red/purple 8/22/2018 10:41 AM   Wound Odor None 8/22/2018 10:41 AM   Periwound Skin Condition Ecchymosis; Indurated 8/22/2018 10:41 AM   Cleansing and Cleansing Agents  Sodium hypochlorite 25 % 8/22/2018 10:41 AM   Dressing Type Applied Packing 8/22/2018 10:41 AM   Procedure Tolerated Well 8/22/2018 10:41 AM   Number of days:1       Wound Scrotum Anterior;Posterior (Active)    DRESSING STATUS Removed 8/22/2018 10:41 AM   DRESSING TYPE Packing 8/22/2018 10:41 AM   Non-Pressure Injury Full thickness (subcut/muscle) 8/22/2018 10:41 AM   Condition of Base Granulation;[place] 8/22/2018 10:41 AM   Condition of Edges Open 8/22/2018 10:41 AM   Tissue Type Red;Pink 8/22/2018 10:41 AM   Direction of Tunnels 6    oclock 8/22/2018 10:41 AM   Drainage Amount  Moderate 8/22/2018 10:41 AM   Drainage Color Serosanguinous 8/22/2018 10:41 AM   Wound Odor None 8/22/2018 10:41 AM   Periwound Skin Condition Macerated 8/22/2018 10:41 AM   Cleansing and Cleansing Agents  Sodium hypochlorite 25 % 8/22/2018 10:41 AM   Dressing Type Applied Packing 8/22/2018 10:41 AM   Procedure Tolerated Well 8/22/2018 10:41 AM   Number of days:1             Plan: Wound care team to do this wound care tomorrow and Friday as well and then the nursing staff can do the wound care over the weekend with specific instructions. Dakin solution to be used for at least ten days on these wounds. Nutrition consult for protein supplements - Ensure and other vitamins / nutrition. Noted concern: Patient was very pale, sweaty and anxious during this wound care partially from the pain but may also have some alcohol withdrawal at this point. He usually drinks about 5-6 beers per day and his albumin is low (1.9). Most of his calories in a day are from beer. Patient admitted to this today.    Destinee Puckett, RN, BSN, CWON ([id number])

## 2018-08-23 LAB
ALBUMIN SERPL-MCNC: 1.8 G/DL (ref 3.5–5)
ALBUMIN/GLOB SERPL: 0.4 {RATIO} (ref 1.1–2.2)
ALP SERPL-CCNC: 55 U/L (ref 45–117)
ALT SERPL-CCNC: 13 U/L (ref 12–78)
ANION GAP SERPL CALC-SCNC: 8 MMOL/L (ref 5–15)
AST SERPL-CCNC: 11 U/L (ref 15–37)
BASOPHILS # BLD: 0 K/UL (ref 0–0.1)
BASOPHILS NFR BLD: 0 % (ref 0–1)
BILIRUB SERPL-MCNC: 0.2 MG/DL (ref 0.2–1)
BUN SERPL-MCNC: 7 MG/DL (ref 6–20)
BUN/CREAT SERPL: 10 (ref 12–20)
CALCIUM SERPL-MCNC: 7.7 MG/DL (ref 8.5–10.1)
CHLORIDE SERPL-SCNC: 107 MMOL/L (ref 97–108)
CO2 SERPL-SCNC: 26 MMOL/L (ref 21–32)
CREAT SERPL-MCNC: 0.67 MG/DL (ref 0.7–1.3)
DATE LAST DOSE: ABNORMAL
DIFFERENTIAL METHOD BLD: ABNORMAL
EOSINOPHIL # BLD: 0.1 K/UL (ref 0–0.4)
EOSINOPHIL NFR BLD: 1 % (ref 0–7)
ERYTHROCYTE [DISTWIDTH] IN BLOOD BY AUTOMATED COUNT: 13.3 % (ref 11.5–14.5)
GLOBULIN SER CALC-MCNC: 4.3 G/DL (ref 2–4)
GLUCOSE SERPL-MCNC: 95 MG/DL (ref 65–100)
HCT VFR BLD AUTO: 24.4 % (ref 36.6–50.3)
HGB BLD-MCNC: 7.6 G/DL (ref 12.1–17)
IMM GRANULOCYTES # BLD: 0.1 K/UL (ref 0–0.04)
IMM GRANULOCYTES NFR BLD AUTO: 1 % (ref 0–0.5)
LYMPHOCYTES # BLD: 1.7 K/UL (ref 0.8–3.5)
LYMPHOCYTES NFR BLD: 15 % (ref 12–49)
MCH RBC QN AUTO: 28.5 PG (ref 26–34)
MCHC RBC AUTO-ENTMCNC: 31.1 G/DL (ref 30–36.5)
MCV RBC AUTO: 91.4 FL (ref 80–99)
MONOCYTES # BLD: 1.1 K/UL (ref 0–1)
MONOCYTES NFR BLD: 10 % (ref 5–13)
NEUTS SEG # BLD: 8.1 K/UL (ref 1.8–8)
NEUTS SEG NFR BLD: 73 % (ref 32–75)
NRBC # BLD: 0 K/UL (ref 0–0.01)
NRBC BLD-RTO: 0 PER 100 WBC
PLATELET # BLD AUTO: 449 K/UL (ref 150–400)
PMV BLD AUTO: 9.2 FL (ref 8.9–12.9)
POTASSIUM SERPL-SCNC: 3.3 MMOL/L (ref 3.5–5.1)
PROT SERPL-MCNC: 6.1 G/DL (ref 6.4–8.2)
RBC # BLD AUTO: 2.67 M/UL (ref 4.1–5.7)
REPORTED DOSE,DOSE: ABNORMAL UNITS
REPORTED DOSE/TIME,TMG: 2100
SODIUM SERPL-SCNC: 141 MMOL/L (ref 136–145)
VANCOMYCIN TROUGH SERPL-MCNC: 13.1 UG/ML (ref 5–10)
WBC # BLD AUTO: 11.2 K/UL (ref 4.1–11.1)

## 2018-08-23 PROCEDURE — 80202 ASSAY OF VANCOMYCIN: CPT | Performed by: SURGERY

## 2018-08-23 PROCEDURE — 74011250637 HC RX REV CODE- 250/637: Performed by: SURGERY

## 2018-08-23 PROCEDURE — 80053 COMPREHEN METABOLIC PANEL: CPT | Performed by: SURGERY

## 2018-08-23 PROCEDURE — 74011250637 HC RX REV CODE- 250/637: Performed by: INTERNAL MEDICINE

## 2018-08-23 PROCEDURE — 36415 COLL VENOUS BLD VENIPUNCTURE: CPT | Performed by: SURGERY

## 2018-08-23 PROCEDURE — 74011250636 HC RX REV CODE- 250/636: Performed by: INTERNAL MEDICINE

## 2018-08-23 PROCEDURE — 74011250636 HC RX REV CODE- 250/636: Performed by: SURGERY

## 2018-08-23 PROCEDURE — 85025 COMPLETE CBC W/AUTO DIFF WBC: CPT | Performed by: SURGERY

## 2018-08-23 PROCEDURE — 94760 N-INVAS EAR/PLS OXIMETRY 1: CPT

## 2018-08-23 PROCEDURE — 74011000250 HC RX REV CODE- 250: Performed by: SURGERY

## 2018-08-23 PROCEDURE — 65270000029 HC RM PRIVATE

## 2018-08-23 RX ORDER — LIDOCAINE HYDROCHLORIDE 20 MG/ML
JELLY TOPICAL AS NEEDED
Status: DISCONTINUED | OUTPATIENT
Start: 2018-08-23 | End: 2018-08-24

## 2018-08-23 RX ORDER — HYDROMORPHONE HYDROCHLORIDE 2 MG/1
4 TABLET ORAL ONCE
Status: COMPLETED | OUTPATIENT
Start: 2018-08-23 | End: 2018-08-23

## 2018-08-23 RX ADMIN — VANCOMYCIN HYDROCHLORIDE 1000 MG: 1 INJECTION, POWDER, LYOPHILIZED, FOR SOLUTION INTRAVENOUS at 13:29

## 2018-08-23 RX ADMIN — METRONIDAZOLE 500 MG: 500 INJECTION, SOLUTION INTRAVENOUS at 15:37

## 2018-08-23 RX ADMIN — OXYCODONE HYDROCHLORIDE 5 MG: 5 TABLET ORAL at 08:44

## 2018-08-23 RX ADMIN — PANTOPRAZOLE SODIUM 40 MG: 40 TABLET, DELAYED RELEASE ORAL at 09:06

## 2018-08-23 RX ADMIN — VANCOMYCIN HYDROCHLORIDE 1000 MG: 1 INJECTION, POWDER, LYOPHILIZED, FOR SOLUTION INTRAVENOUS at 21:07

## 2018-08-23 RX ADMIN — VANCOMYCIN HYDROCHLORIDE 1000 MG: 1 INJECTION, POWDER, LYOPHILIZED, FOR SOLUTION INTRAVENOUS at 06:57

## 2018-08-23 RX ADMIN — HYDROMORPHONE HYDROCHLORIDE 1 MG: 2 INJECTION, SOLUTION INTRAMUSCULAR; INTRAVENOUS; SUBCUTANEOUS at 12:37

## 2018-08-23 RX ADMIN — HYDROMORPHONE HYDROCHLORIDE 4 MG: 2 TABLET ORAL at 12:01

## 2018-08-23 RX ADMIN — LEVOFLOXACIN 750 MG: 5 INJECTION, SOLUTION INTRAVENOUS at 04:25

## 2018-08-23 RX ADMIN — HYDROMORPHONE HYDROCHLORIDE 1 MG: 2 INJECTION, SOLUTION INTRAMUSCULAR; INTRAVENOUS; SUBCUTANEOUS at 20:52

## 2018-08-23 RX ADMIN — OXYCODONE HYDROCHLORIDE 5 MG: 5 TABLET ORAL at 18:31

## 2018-08-23 RX ADMIN — OXYCODONE HYDROCHLORIDE 5 MG: 5 TABLET ORAL at 02:39

## 2018-08-23 RX ADMIN — SODIUM HYPOCHLORITE: 1.25 SOLUTION TOPICAL at 12:03

## 2018-08-23 RX ADMIN — METRONIDAZOLE 500 MG: 500 INJECTION, SOLUTION INTRAVENOUS at 02:39

## 2018-08-23 NOTE — PROGRESS NOTES
General Surgery End of Shift Nursing Note    Bedside shift change report given to Kalani Calhoun (oncoming nurse) by Leonor Chun (offgoing nurse). Report included the following information SBAR, Kardex, OR Summary, Intake/Output, MAR and Recent Results. Shift worked:   7 am to 7 pm    Summary of shift:  Wound care completed by wound care nurses. Pain meds given as requested. Tolerating diet. CIWA scale initiated. Issues for physician to address:   none     Number times ambulated in hallway past shift: 0    Number of times OOB to chair past shift: 0    Pain Management:  Current medication: roxicodone and dilaudid  Patient states pain is manageable on current pain medication: YES    GI:    Current diet:  DIET REGULAR  DIET NUTRITIONAL SUPPLEMENTS No; Breakfast, Lunch, Dinner; Ensure [place] current diet: YES  Passing flatus: YES  Last Bowel Movement: several days ago      Respiratory:    Incentive Spirometer at bedside: YES  Patient instructed on use: YES    Patient Safety:    Falls Score: 1  Bed Alarm On? No  Sitter?  No    Enedina Miranda

## 2018-08-23 NOTE — PROGRESS NOTES
Reason for Admission:   Sepsis and abscess                   RRAT Score:          8         Plan for utilizing home health:     Pt is independent with ADL's and active, does not drive or work, disabled. Dependent on father, girlfriend for some IADL's. Pt has used [facility] and does not want again. DME at home is a cane,crutches but not used unless gout acts up. Pt has not stayed in a SNF/Rehab. CM will follow for discharge needs, possible HH for at least SN for wound care, will await PT. OT recommendations. Likelihood of Readmission:  low                         Transition of Care Plan:     Patient is a 39year old male. Pt met with pt, introduced self and role. Pt alert/oriented in no acute distress. Demographics verified. Pt lives in a 1 story home with 4 steps into entrance with his girlfriend and his teenage son. Pt father or girlfriend will transport to home at discharge and assist with follow up appt. Care Management Interventions  PCP Verified by CM: Yes (no PCP , goes to ED when needed. open to Counts include 234 beds at the [facility] PCP list, given)  Mode of Transport at Discharge: Other (see comment) (father or girlfriend will tranport to home at discharge)  Transition of Care Consult (CM Consult): Discharge Planning  Discharge Durable Medical Equipment: No (none at home)  Physical Therapy Consult: Yes  Occupational Therapy Consult: Yes  Speech Therapy Consult: No  Current Support Network: Own Home, Lives with Spouse (lives in a 1 story home with 4 steps into entrance with girlfriend and his teenage son)  Confirm Follow Up Transport: Family (father or girlfriend will transport to follow up appt)  Plan discussed with Pt/Family/Caregiver:  Yes    Kenneth Qiu, CRYSN, RN  Care Manager [id number]  [phone]

## 2018-08-23 NOTE — PROGRESS NOTES
CRS POD#2 s/p perineal/scrotal debridement    Tolerated dressing change a little better today but still required IV dilaudid. /76 (BP 1 Location: Left arm, BP Patient Position: At rest)  Pulse (!) 114  Temp 97.7 °F (36.5 °C)  Resp 18  Ht 6' 2\" (1.88 m)  Wt 81.6 kg (179 lb 14.3 oz)  SpO2 98%  BMI 23.1 kg/m2    NAD, AAOx3  Wound bed has healthy pink tissue at the base without foul smelling odor or purulent discharge. Packed with 1/4 strength Dakins      WBC improved today. Now that inflammation is improving, I definitely agree that this appears to look more like infected hidradenitis. He has thickened skin with punctate lesions surrounding the perianal area. Continue antibiotics. Will taper once micro is complete. Monitor WBC. D/c jo today.   He will need to stay in the hospital since he requires IV pain meds for dressing changes

## 2018-08-23 NOTE — PROGRESS NOTES
Bedside shift change report given to [id number] Eduard Power (oncoming nurse) by Ryanne Green (offgoing nurse). Report included the following information SBAR, Kardex, OR Summary, Procedure Summary, Intake/Output, MAR, Recent Results. Pt pain managed with scheduled and PRN meds. Wound team performed wound care today. Pt tolerated IV antibiotics and fluids well.

## 2018-08-23 NOTE — PROGRESS NOTES
Occupational Therapy  Medical record reviewed. Pt declines therapy this date as he reports that  he has had a lot of pain medication and is feeling \"high\" and \"unsteady\". He would like to wait until tomorrow to participate. Will defer and continue to follow to initiate OT Evaluation.

## 2018-08-23 NOTE — PROGRESS NOTES
Chart reviewed. Attempted to see pt for PT evaluation however pt reporting that he feels [place] and too unsteady\" as a result of receiving \"a lot of pain meds. \" Pt requesting therapy evaluation hold until tomorrow. Will defer however continue to follow.  Thank you    Lucas Bradshaw, PT, DPT

## 2018-08-23 NOTE — PROGRESS NOTES
Interdisciplinary Rounds were completed on this patient. Rounds included nursing, clinical care leader, pharmacy, and case management.      Plan for the day: wound care  Plan for the stay:  Wound care   Activity: Up with assist. PT consult ordered    Anticipated discharge date: 8/26

## 2018-08-23 NOTE — PROGRESS NOTES
Pharmacy Automatic Renal Dosing Protocol - Antimicrobials    Indication for Antimicrobials: gluteal abscess, sepsis    Current Regimen of Each Antimicrobial:  Vancomycin IV (Start Date ; Day # 3 of 7)  Metronidazole 500 mg IV q 8 hours (Start Date ; Day # 3 of )  Levofloxacin 500 mg IV q 24 hours (Start Date ; Day # 3 of )    Previous Antimicrobial Therapy:  Patient received vancomycin (1000 mg IV), levofloxacin (750 mg IV) and metronidazole (500 mg IV) x 1 at \[facility]\"" on  prior to ED [facility] ED transfer    Goal Level: VANCOMYCIN TROUGH GOAL RANGE    Vancomycin Trough: 15 - 20 mcg/mL    Date Dose & Interval Measured (mcg/mL) Extrapolated (mcg/mL)   Ania@[employer] 1g q8h 13.1 13.1                 Significant Cultures:    (blood): NGX2d   (urine): NEG= FINAL   (wound): beta hemolytic strept- prelim   (anaerobic):    Radiology / Imaging results: (X-ray, CT scan or MRI):    (CT abd): extensive inflammatory changes in gluteal soft tissue abscess along L aspect of gluteal crease and extending to base of scrotum    Paralysis, amputations, malnutrition: N/A    Labs:  Recent Labs      18   0556  18   0555  18   0329  18   0115   CREA  0.67*   --   0.65*  0.99   BUN  7   --   7  10   WBC   --   11.2*  24.9*  28.9*     Temp (24hrs), Av °F (36.7 °C), Min:97.7 °F (36.5 °C), Max:98.4 °F (36.9 °C)    Creatinine Clearance (mL/min) or Dialysis: ~100 mL/min    Impression/Plan:   · SCr stable  · Levofloxacin dose adjusted to 750 mg q24h  · Metronidazole dose adjusted to 500 mg IV q 12 hours per protocol   · Antimicrobial stop date 7 days  · Daily BMP ordered per protocol  · Vancomycin trough= 13.1= therapeutic, order follow-up level as needed (will probably only need if SCr unstable or duration extended)     Pharmacy will follow daily and adjust medications as appropriate for renal function and/or serum levels.     Recommended duration of therapy  http://[facility]/[place]/[facility]/Pharmacy/Clinical%20Companion/Duration%20of%20ABX%20therapy. docx    Renal Dosing  http://[facility]/[place]/[facility]/Pharmacy/Clinical%20Companion/Renal%[email or web address]. pdf

## 2018-08-23 NOTE — WOUND CARE
Wound Care Consult / Follow up post op day # 2: Chart reviewed and patient assessed for his scrotal wound and buttocks wounds. Dr. Jayesh Broussard at the bedside and agrees that patient most likely has Hidradenitis that has never been diagnosed. Pt. Has had these skin issues for about 10 years and each year they get worse. Assessment: He has the typical scarring pattern and skin tags and shallow skin tunnels that drain purulent fluid. The wound cultures are pending but so far growing easily treated organisms. Patient needs IV pain medications to withstand the wound care currently due to the intense inflammation of the open wound and the punctate skin lesions that are draining. The wound packing was removed and the wound bed irrigated with 2% lidocaine jelly for topical pain relief. The rest of the wound packing on the buttocks wounds was removed and then irrigated with the Dakin solution using syringe and filter straw inserted into the two tunneling skin lesions. Each of these wounds were packed with plain packing moistened with Dakin solution (sodium hypochlorite). The scrotal wound was cleansed with the Dakin solution and wiped with gauze to remove the old drainage. He tolerated it better today with the use of the lidocaine jelly. The scrotum was packed with Kerlix moistened with Dakin Solution and then covered with absorbent pads and secured with the post op short pants. Plan: Will continue to monitor and the Wound Care nurse will do the wound care again tomorrow. Gen Surg nurses will continue the packing over the weekend as ordered.    Jennifer Crowley RN, BSN, CWON ([id number])

## 2018-08-24 LAB
ALBUMIN SERPL-MCNC: 1.8 G/DL (ref 3.5–5)
ALBUMIN/GLOB SERPL: 0.4 {RATIO} (ref 1.1–2.2)
ALP SERPL-CCNC: 59 U/L (ref 45–117)
ALT SERPL-CCNC: 17 U/L (ref 12–78)
ANION GAP SERPL CALC-SCNC: 6 MMOL/L (ref 5–15)
AST SERPL-CCNC: 20 U/L (ref 15–37)
BACTERIA SPEC CULT: ABNORMAL
BACTERIA SPEC CULT: NORMAL
BASOPHILS # BLD: 0 K/UL (ref 0–0.1)
BASOPHILS NFR BLD: 0 % (ref 0–1)
BILIRUB SERPL-MCNC: <0.1 MG/DL (ref 0.2–1)
BUN SERPL-MCNC: 7 MG/DL (ref 6–20)
BUN/CREAT SERPL: 12 (ref 12–20)
CALCIUM SERPL-MCNC: 7.5 MG/DL (ref 8.5–10.1)
CHLORIDE SERPL-SCNC: 107 MMOL/L (ref 97–108)
CO2 SERPL-SCNC: 27 MMOL/L (ref 21–32)
CREAT SERPL-MCNC: 0.58 MG/DL (ref 0.7–1.3)
DIFFERENTIAL METHOD BLD: ABNORMAL
EOSINOPHIL # BLD: 0.4 K/UL (ref 0–0.4)
EOSINOPHIL NFR BLD: 4 % (ref 0–7)
ERYTHROCYTE [DISTWIDTH] IN BLOOD BY AUTOMATED COUNT: 13.3 % (ref 11.5–14.5)
GLOBULIN SER CALC-MCNC: 4.1 G/DL (ref 2–4)
GLUCOSE SERPL-MCNC: 101 MG/DL (ref 65–100)
GRAM STN SPEC: ABNORMAL
HCT VFR BLD AUTO: 24.6 % (ref 36.6–50.3)
HGB BLD-MCNC: 7.8 G/DL (ref 12.1–17)
IMM GRANULOCYTES # BLD: 0.1 K/UL (ref 0–0.04)
IMM GRANULOCYTES NFR BLD AUTO: 1 % (ref 0–0.5)
LYMPHOCYTES # BLD: 1.7 K/UL (ref 0.8–3.5)
LYMPHOCYTES NFR BLD: 16 % (ref 12–49)
MCH RBC QN AUTO: 28.9 PG (ref 26–34)
MCHC RBC AUTO-ENTMCNC: 31.7 G/DL (ref 30–36.5)
MCV RBC AUTO: 91.1 FL (ref 80–99)
MONOCYTES # BLD: 1 K/UL (ref 0–1)
MONOCYTES NFR BLD: 9 % (ref 5–13)
NEUTS SEG # BLD: 7.4 K/UL (ref 1.8–8)
NEUTS SEG NFR BLD: 70 % (ref 32–75)
NRBC # BLD: 0 K/UL (ref 0–0.01)
NRBC BLD-RTO: 0 PER 100 WBC
PLATELET # BLD AUTO: 445 K/UL (ref 150–400)
PMV BLD AUTO: 9 FL (ref 8.9–12.9)
POTASSIUM SERPL-SCNC: 3.6 MMOL/L (ref 3.5–5.1)
PROT SERPL-MCNC: 5.9 G/DL (ref 6.4–8.2)
RBC # BLD AUTO: 2.7 M/UL (ref 4.1–5.7)
SERVICE CMNT-IMP: ABNORMAL
SERVICE CMNT-IMP: ABNORMAL
SERVICE CMNT-IMP: NORMAL
SODIUM SERPL-SCNC: 140 MMOL/L (ref 136–145)
WBC # BLD AUTO: 10.6 K/UL (ref 4.1–11.1)

## 2018-08-24 PROCEDURE — G8980 MOBILITY D/C STATUS: HCPCS

## 2018-08-24 PROCEDURE — G8978 MOBILITY CURRENT STATUS: HCPCS

## 2018-08-24 PROCEDURE — G8987 SELF CARE CURRENT STATUS: HCPCS | Performed by: OCCUPATIONAL THERAPIST

## 2018-08-24 PROCEDURE — G8979 MOBILITY GOAL STATUS: HCPCS

## 2018-08-24 PROCEDURE — 74011250637 HC RX REV CODE- 250/637: Performed by: INTERNAL MEDICINE

## 2018-08-24 PROCEDURE — 80053 COMPREHEN METABOLIC PANEL: CPT | Performed by: SURGERY

## 2018-08-24 PROCEDURE — 36415 COLL VENOUS BLD VENIPUNCTURE: CPT | Performed by: SURGERY

## 2018-08-24 PROCEDURE — 94760 N-INVAS EAR/PLS OXIMETRY 1: CPT

## 2018-08-24 PROCEDURE — 97535 SELF CARE MNGMENT TRAINING: CPT | Performed by: OCCUPATIONAL THERAPIST

## 2018-08-24 PROCEDURE — 97161 PT EVAL LOW COMPLEX 20 MIN: CPT

## 2018-08-24 PROCEDURE — G8988 SELF CARE GOAL STATUS: HCPCS | Performed by: OCCUPATIONAL THERAPIST

## 2018-08-24 PROCEDURE — 74011250636 HC RX REV CODE- 250/636: Performed by: INTERNAL MEDICINE

## 2018-08-24 PROCEDURE — 97116 GAIT TRAINING THERAPY: CPT

## 2018-08-24 PROCEDURE — 65270000029 HC RM PRIVATE

## 2018-08-24 PROCEDURE — 85025 COMPLETE CBC W/AUTO DIFF WBC: CPT | Performed by: SURGERY

## 2018-08-24 PROCEDURE — 77030011256 HC DRSG MEPILEX <16IN NO BORD MOLN -A

## 2018-08-24 PROCEDURE — 74011250636 HC RX REV CODE- 250/636: Performed by: SURGERY

## 2018-08-24 PROCEDURE — G8989 SELF CARE D/C STATUS: HCPCS | Performed by: OCCUPATIONAL THERAPIST

## 2018-08-24 PROCEDURE — 97165 OT EVAL LOW COMPLEX 30 MIN: CPT | Performed by: OCCUPATIONAL THERAPIST

## 2018-08-24 RX ORDER — LIDOCAINE HYDROCHLORIDE 20 MG/ML
JELLY TOPICAL DAILY
Status: DISCONTINUED | OUTPATIENT
Start: 2018-08-25 | End: 2018-08-29 | Stop reason: HOSPADM

## 2018-08-24 RX ORDER — SODIUM CHLORIDE 0.9 % (FLUSH) 0.9 %
SYRINGE (ML) INJECTION
Status: COMPLETED
Start: 2018-08-24 | End: 2018-08-24

## 2018-08-24 RX ADMIN — HYDROMORPHONE HYDROCHLORIDE 1 MG: 2 INJECTION, SOLUTION INTRAMUSCULAR; INTRAVENOUS; SUBCUTANEOUS at 20:41

## 2018-08-24 RX ADMIN — OXYCODONE HYDROCHLORIDE 5 MG: 5 TABLET ORAL at 08:07

## 2018-08-24 RX ADMIN — METRONIDAZOLE 500 MG: 500 INJECTION, SOLUTION INTRAVENOUS at 03:26

## 2018-08-24 RX ADMIN — Medication 10 ML: at 13:19

## 2018-08-24 RX ADMIN — PANTOPRAZOLE SODIUM 40 MG: 40 TABLET, DELAYED RELEASE ORAL at 09:01

## 2018-08-24 RX ADMIN — VANCOMYCIN HYDROCHLORIDE 1000 MG: 1 INJECTION, POWDER, LYOPHILIZED, FOR SOLUTION INTRAVENOUS at 05:00

## 2018-08-24 RX ADMIN — OXYCODONE HYDROCHLORIDE 5 MG: 5 TABLET ORAL at 16:16

## 2018-08-24 RX ADMIN — SODIUM CHLORIDE 100 ML/HR: 900 INJECTION, SOLUTION INTRAVENOUS at 18:50

## 2018-08-24 RX ADMIN — HYDROMORPHONE HYDROCHLORIDE 1 MG: 2 INJECTION, SOLUTION INTRAMUSCULAR; INTRAVENOUS; SUBCUTANEOUS at 13:17

## 2018-08-24 RX ADMIN — LEVOFLOXACIN 750 MG: 5 INJECTION, SOLUTION INTRAVENOUS at 04:46

## 2018-08-24 RX ADMIN — OXYCODONE HYDROCHLORIDE 5 MG: 5 TABLET ORAL at 12:16

## 2018-08-24 RX ADMIN — VANCOMYCIN HYDROCHLORIDE 1000 MG: 1 INJECTION, POWDER, LYOPHILIZED, FOR SOLUTION INTRAVENOUS at 13:23

## 2018-08-24 RX ADMIN — OXYCODONE HYDROCHLORIDE 5 MG: 5 TABLET ORAL at 00:30

## 2018-08-24 RX ADMIN — VANCOMYCIN HYDROCHLORIDE 1000 MG: 1 INJECTION, POWDER, LYOPHILIZED, FOR SOLUTION INTRAVENOUS at 20:42

## 2018-08-24 RX ADMIN — METRONIDAZOLE 500 MG: 500 INJECTION, SOLUTION INTRAVENOUS at 18:52

## 2018-08-24 RX ADMIN — OXYCODONE HYDROCHLORIDE 5 MG: 5 TABLET ORAL at 04:46

## 2018-08-24 NOTE — PROGRESS NOTES
Problem: Falls - Risk of  Goal: *Absence of Falls  Document Alon Fall Risk and appropriate interventions in the flowsheet. Outcome: Progressing Towards Goal  Fall Risk Interventions:  Mobility Interventions: Patient to call before getting OOB, Strengthening exercises (ROM-active/passive)         Medication Interventions: Patient to call before getting OOB, Teach patient to arise slowly    Elimination Interventions: Patient to call for help with toileting needs, Call light in reach             Problem: Pressure Injury - Risk of  Goal: *Prevention of pressure injury  Document Landen Scale and appropriate interventions in the flowsheet.    Outcome: Progressing Towards Goal  Pressure Injury Interventions:       Moisture Interventions: Absorbent underpads    Activity Interventions: Increase time out of bed, Pressure redistribution bed/mattress(bed type)    Mobility Interventions: Pressure redistribution bed/mattress (bed type), HOB 30 degrees or less    Nutrition Interventions: Document food/fluid/supplement intake    Friction and Shear Interventions: Lift sheet, HOB 30 degrees or less               Comments: RA    8/24 loose, diarrhea    Voids/ urinal    Wound care dressing changes perineal and scrotal

## 2018-08-24 NOTE — PROGRESS NOTES
Occupational Therapy EVALUATION/discharge  Patient: Mamta Villalba (1 y.o. male)  Date: 8/24/2018  Primary Diagnosis: Sepsis ([address]  Perianal Abscess  Procedure(s) (LRB):  INCISION AND DRAINAGE PERIANAL ABSCESS , flex cystoscopy (N/A) 3 Days Post-Op   Precautions: standard       ASSESSMENT:   Based on the objective data described below, the patient presents with near baseline level of functioning for adls. Pt's surgery/ surgical pain has limited his participation in adls and mobility. He will need set up if at bed level for self care,  and stand by assistance for bathing if at the sink; he will likely need assistance for managing his wounds (and their dressings) when toileting. Pt demonstrated good strength and ROM for performing adls/mobility. Pt reports that his girlfriend and son will be able to assist him at home. .  Further skilled acute occupational therapy is not indicated at this time. Discharge Recommendations: None  Further Equipment Recommendations for Discharge: none      SUBJECTIVE:   Patient stated they'll be able to help me . .. My father's close by.    OBJECTIVE DATA SUMMARY:   HISTORY:   Past Medical History:   Diagnosis Date    Gluteal abscess     Recurrent    Ill-defined condition     GOUT     Past Surgical History:   Procedure Laterality Date    HX OTHER SURGICAL  2016    Gluteal abscess I&D       Prior Level of Function/Environment/Context: Pt lives with his girlfriend and son-both work during the day. Pt will be home alone, he does not drive and is on disability. Pt has been independent in adls and mobiltiy.     Occupations in which the patient is/was successful, what are the barriers preventing that success: pain, surgical site/ wound care  Performance Patterns (routines, roles, habits, and rituals):   Personal Interests and/or values:   Expanded or extensive additional review of patient history:     Home Situation  Home Environment: Private residence  # Steps to Enter: 5  Rails to Enter: Yes  Hand Rails : Right  One/Two Story Residence: One story  Living Alone: No  Support Systems: Spouse/Significant Other/Partner, Child(zhou)  Patient Expects to be Discharged to[de-identified] Private residence  Current DME Used/Available at Home: Cane, straight, Crutches  Tub or Shower Type: Tub/Shower combination    Hand dominance: Right    EXAMINATION OF PERFORMANCE DEFICITS:  Cognitive/Behavioral Status:  Neurologic State: Alert; Appropriate for age  Orientation Level: Appropriate for age  Cognition: Appropriate decision making; Follows commands  Perception: Appears intact  Perseveration: No perseveration noted       Skin: large surgical wound that is followed by wound care, extremely painful requiring narcotics for pain management    Edema: none observed    Hearing: Auditory  Auditory Impairment: None    Vision/Perceptual:                           Acuity:  (not formally tested)    Corrective Lenses:  (none present)    Range of Motion:  BUEs:    AROM: Within functional limits                         Strength:  BUEs:  Good overall strength at baseline  Strength: Within functional limits                Coordination:  Coordination: Within functional limits  Fine Motor Skills-Upper: Left Intact; Right Intact    Gross Motor Skills-Upper: Left Intact; Right Intact    Tone & Sensation:    Tone: Normal  Sensation: Intact                      Balance:  Sitting: Intact  Standing: Intact    Functional Mobility and Transfers for ADLs:  Bed Mobility:  Rolling: Modified independent  Supine to Sit: Modified independent  Sit to Supine: Modified independent  Scooting: Modified independent    Transfers:  Sit to Stand: Modified independent  Stand to Sit: Modified independent  Bed to Chair:  (declined sitting in chair due to discomfort)  Toilet Transfer :  (declined)    ADL Assessment:  Feeding: Independent    Oral Facial Hygiene/Grooming: Setup    Bathing: Stand-by assistance;Setup    Upper Body Dressing: Setup    Lower Body Dressing: Stand-by assistance;Setup    Toileting: Stand by assistance (for bandage/wound care)                ADL Intervention and task modifications:   Pt educated on role of OT and OT plan of care. Pt reported pain was decreased and performed functional mobility in room, declining toilet transfer and standing adls this date. Pt returned to bed as he was most comfortable there due to surgical wounds. Pt was educated to partake in good nutrition for wound healing. Advised him that smoking impairs healing with which he reported \" well . .. One step at a time. \"   Did not observed tremulousness this session. Therapeutic Exercise:  encouraged OOB and ambulating with staff often during the rest of his hospital stay. Functional Measure:  Barthel Index:    Bathin  Bladder: 10  Bowels: 10  Groomin  Dressin  Feeding: 10  Mobility: 0  Stairs: 0  Toilet Use: 10  Transfer (Bed to Chair and Back): 10  Total: 65       Barthel and G-code impairment scale:  Percentage of impairment CH  0% CI  1-19% CJ  20-39% CK  40-59% CL  60-79% CM  80-99% CN  100%   Barthel Score 0-100 100 99-80 79-60 59-40 20-39 1-19   0   Barthel Score 0-20 20 17-19 13-16 9-12 5-8 1-4 0      The Barthel ADL Index: Guidelines  1. The index should be used as a record of what a patient does, not as a record of what a patient could do. 2. The main aim is to establish degree of independence from any help, physical or verbal, however minor and for whatever reason. 3. The need for supervision renders the patient not independent. 4. A patient's performance should be established using the best available evidence. Asking the patient, friends/relatives and nurses are the usual sources, but direct observation and common sense are also important. However direct testing is not needed. 5. Usually the patient's performance over the preceding 24-48 hours is important, but occasionally longer periods will be relevant.   6. Middle categories imply that the patient supplies over 50 per cent of the effort. 7. Use of aids to be independent is allowed. Irene Saver., Barthel, D.W. ([id number]). Functional evaluation: the Barthel Index. [address] (14)2. BIBI Howell, Kristian Wharton., [place], [address] [facility] (1999). Measuring the change indisability after inpatient rehabilitation; comparison of the responsiveness of the Barthel Index and Functional [place] Measure. Journal of Neurology, Neurosurgery, and Psychiatry, 66(4), 237-721. LUCINA Bedoay, ARTURO Page, & Barb Latif M.A. (2004.) Assessment of post-stroke quality of life in cost-effectiveness studies: The usefulness of the Barthel Index and the EuroQoL-5D. Quality of Life Research, 13, 445-67       G codes: In compliance with CMSs Claims Based Outcome Reporting, the following G-code set was chosen for this patient based on their primary functional limitation being treated: The outcome measure chosen to determine the severity of the functional limitation was the BArthel Index with a score of 65/100 which was correlated with the impairment scale. ? Self Care:     - CURRENT STATUS: CJ - 20%-39% impaired, limited or restricted    - GOAL STATUS: CJ - 20%-39% impaired, limited or restricted    - D/C STATUS:  CJ - 20%-39% impaired, limited or restricted     Occupational Therapy Evaluation Charge Determination   History Examination Decision-Making   LOW Complexity : Brief history review  MEDIUM Complexity : 3-5 performance deficits relating to physical, cognitive , or psychosocial skils that result in activity limitations and / or participation restrictions MEDIUM Complexity : Patient may present with comorbidities that affect occupational performnce.  Miniml to moderate modification of tasks or assistance (eg, physical or verbal ) with assesment(s) is necessary to enable patient to complete evaluation       Based on the above components, the patient evaluation is determined to be of the following complexity level: LOW   Pain:  Pain Scale 1: Numeric (0 - 10)  Pain Intensity 1: 6  Pain Location 1: Perineum         Activity Tolerance:   VSS generally stable. After treatment:   []  Patient left in no apparent distress sitting up in chair  [x]  Patient left in no apparent distress in bed  [x]  Call bell left within reach  [x]  Nursing notified  []  Caregiver present  []  Bed alarm activated    COMMUNICATION/EDUCATION:   Communication/Collaboration:  []      Home safety education was provided and the patient/caregiver indicated understanding. [x]      Patient/family have participated as able and agree with findings and recommendations. []      Patient is unable to participate in plan of care at this time.   Findings and recommendations were discussed with: Physical Therapist and Registered Nurse    KEELY Mitchell/L  Time Calculation: 18 mins

## 2018-08-24 NOTE — PROGRESS NOTES
physical Therapy EVALUATION/DISCHARGE  Patient: David Villa (76 y.o. male)  Date: 8/24/2018  Primary Diagnosis: Sepsis ([address]  Perianal Abscess  Procedure(s) (LRB):  INCISION AND DRAINAGE PERIANAL ABSCESS , flex cystoscopy (N/A) 3 Days Post-Op   Precautions:      ASSESSMENT :  Based on the objective data described below, the patient presents with increased dizziness (likely due to pain meds) and increased pain levels resulting in impaired gait mechanics and decreased activity tolerance however good strength, intact balance, and overall baseline independent/modified independent functional mobility. Pt received supine in bed, agreeable to participation with therapy despite c/o 5/10 scrotal pain. Pt performed all aspects of functional mobility at modified independent/supervision level including bed mobility, sit<>stand transfer, and ambulation trial of 60ft. Gait steady and stable overall despite pt exhibiting widened GONZALES (for pain relief) and decreased gait speed. No LOB/balance deficits noted. Pt c/o increased dizziness initially during assuming standing position however likely due to pain medications as vital signs stable. At this time, pt has no further skilled therapy needs and is safe to [facility] with RN. JOSE PT    Skilled physical therapy is not indicated at this time. PLAN :  Discharge Recommendations: None  Further Equipment Recommendations for Discharge: None     SUBJECTIVE:   Patient stated I have three great danes at home.     OBJECTIVE DATA SUMMARY:   HISTORY:    Past Medical History:   Diagnosis Date    Gluteal abscess     Recurrent    Ill-defined condition     GOUT     Past Surgical History:   Procedure Laterality Date    HX OTHER SURGICAL  2016    Gluteal abscess I&D     Prior Level of Function/Home Situation: Independent w/ ambulation. Denies history of falls. Does not work as he is on disability. Lives with girlfriend and son however they work during the day.  States his father is in good health and able to assist at discharge. Personal factors and/or comorbidities impacting plan of care:     Home Situation  Home Environment: Private residence  # Steps to Enter: 5  Rails to Enter: Yes  Hand Rails : Right  One/Two Story Residence: One story  Living Alone: No  Support Systems: Spouse/Significant Other/Partner, Child(zhou)  Patient Expects to be Discharged to[de-identified] Private residence  Current DME Used/Available at Home: Cane, straight, Crutches  Tub or Shower Type: Tub/Shower combination    EXAMINATION/PRESENTATION/DECISION MAKING:   Critical Behavior:  Neurologic State: Appropriate for age  Orientation Level: Oriented X4  Cognition: Appropriate decision making     Hearing: Auditory  Auditory Impairment: None  Skin:  Dressing clean, dry, itnact  Edema: none noted   Range Of Motion:  AROM: Within functional limits                       Strength:    Strength:  Within functional limits                    Tone & Sensation:   Tone: Normal              Sensation: Intact               Coordination:  Coordination: Within functional limits  Vision:      Functional Mobility:  Bed Mobility:  Rolling: Modified independent  Supine to Sit: Modified independent  Sit to Supine: Modified independent  Scooting: Modified independent  Transfers:  Sit to Stand: Modified independent  Stand to Sit: Modified independent                       Balance:   Sitting: Intact  Standing: Intact  Ambulation/Gait Training:  Distance (ft): 60 Feet (ft)  Assistive Device: Gait belt  Ambulation - Level of Assistance: Supervision        Gait Abnormalities: Decreased step clearance        Base of Support: Widened     Speed/Pretty: Pace decreased (<100 feet/min)                             Functional Measure:  Barthel Index:    Bathin  Bladder: 10  Bowels: 10  Groomin  Dressin  Feeding: 10  Mobility: 0  Stairs: 0  Toilet Use: 10  Transfer (Bed to Chair and Back): 10  Total: 65       Barthel and G-code impairment scale:  Percentage of impairment CH  0% CI  1-19% CJ  20-39% CK  40-59% CL  60-79% CM  80-99% CN  100%   Barthel Score 0-100 100 99-80 79-60 59-40 20-39 1-19   0   Barthel Score 0-20 20 17-19 13-16 9-12 5-8 1-4 0      The Barthel ADL Index: Guidelines  1. The index should be used as a record of what a patient does, not as a record of what a patient could do. 2. The main aim is to establish degree of independence from any help, physical or verbal, however minor and for whatever reason. 3. The need for supervision renders the patient not independent. 4. A patient's performance should be established using the best available evidence. Asking the patient, friends/relatives and nurses are the usual sources, but direct observation and common sense are also important. However direct testing is not needed. 5. Usually the patient's performance over the preceding 24-48 hours is important, but occasionally longer periods will be relevant. 6. Middle categories imply that the patient supplies over 50 per cent of the effort. 7. Use of aids to be independent is allowed. Kandace Matias., Barthel, DEmberW. ([id number]). Functional evaluation: the Barthel Index. [address] [facility] (14)2. Mansoor Haque goran Johnathan, BIBI, Tesha Osuna., Alta Vogel., [place], [address] [place] [address] (1999). Measuring the change indisability after inpatient rehabilitation; comparison of the responsiveness of the Barthel Index and Functional [place] Measure. Journal of Neurology, Neurosurgery, and Psychiatry, 66(4), 231-492. Grant Flynn, N.GARY.ROYER, ARTURO Page, & Michael Matthews, MEmberA. (2004.) Assessment of post-stroke quality of life in cost-effectiveness studies: The usefulness of the Barthel Index and the EuroQoL-5D. Quality of Life Research, 13, 560-33       G codes: In compliance with CMSs Claims Based Outcome Reporting, the following G-code set was chosen for this patient based on their primary functional limitation being treated:     The outcome measure chosen to determine the severity of the functional limitation was the Barthel Index with a score of 65/100 which was correlated with the impairment scale. ? Mobility - Walking and Moving Around:     - CURRENT STATUS: CJ - 20%-39% impaired, limited or restricted    - GOAL STATUS: CJ - 20%-39% impaired, limited or restricted    - D/C STATUS:  CJ - 20%-39% impaired, limited or restricted        Physical Therapy Evaluation Charge Determination   History Examination Presentation Decision-Making   MEDIUM  Complexity : 1-2 comorbidities / personal factors will impact the outcome/ POC  MEDIUM Complexity : 3 Standardized tests and measures addressing body structure, function, activity limitation and / or participation in recreation  MEDIUM Complexity : Evolving with changing characteristics  MEDIUM Complexity : FOTO score of 26-74      Based on the above components, the patient evaluation is determined to be of the following complexity level: MEDIUM    Pain:           Activity Tolerance:   Hypertensive however stable  Please refer to the flowsheet for vital signs taken during this treatment. After treatment:   []   Patient left in no apparent distress sitting up in chair  [x]   Patient left in no apparent distress in bed  [x]   Call bell left within reach  [x]   Nursing notified  []   Caregiver present  []   Bed alarm activated    COMMUNICATION/EDUCATION:   Communication/Collaboration:  [x]   Fall prevention education was provided and the patient/caregiver indicated understanding. [x]   Patient/family have participated as able and agree with findings and recommendations. []   Patient is unable to participate in plan of care at this time.   Findings and recommendations were discussed with: Occupational Therapist and Registered Nurse    Thank you for this referral.  Sergio Sheth, PT, DPT   Time Calculation: 16 mins

## 2018-08-24 NOTE — WOUND CARE
Wound care nurse changed perineal/scrotal  And buttock dressings. Patient pre-medicated with PO pain medicine x1 hour before wound care. IV pain meds given prior to dressing removal and Lidocaine gel used before wound cleansing. .    Wounds look clean, less draining from buttock wounds. Patient had BM last evening and scrotal/perineum packing fell out and was not replaced. Absorptive pad still covering wound. Staff nurse to take over wound care for the weekend. Wound care to buttocks and perineum and scrotum:  1. Patient needs to be premedicated prior to dressing change and get Lidocaine gel Uro jet from pyxis. 2. Supplies needed - Dakins solution, new scissor pack, Kerlix, 1/2\" plain packing, q-tips, x3 small foam dressings and new periipad and disposable underwear. 3. Start with buttock wounds> Patient to turn on left side. 1st - remove old packing, wipe skin and wounds clean with Dakins moistened 4x4. Pack x2 wounds with Dakins moist 1/2\" packing strips and cover with foam dressing. There is a partial thickness wound to left buttock - cover with small foam dressing also after cleaning. 4. Have patient turn on to back and \"frog\" legs open. Remove packing and squirt lidocaine gel into wound and let sit for 1-2 minutes. Wipe wound base clean with Dakins moist 4x4 after 1-2 minutes. Pack wound with Dakins moist Kerlix gauze. Cover with fracisco-pad and place new briefs on patient to hold in place. Should patient have BM, scrotal/perineal packing will fall out and needs to be replaced. Plan:  nurse to re-visit patient Monday if still here.     Argentina Mancini RN, [employer]

## 2018-08-24 NOTE — PROGRESS NOTES
CRS POD#3 s/p perineal/scrotal debridement    No overnight issues. Wants to have a BM today. Voiding without jo    /89 (BP 1 Location: Right arm, BP Patient Position: At rest)  Pulse 99  Temp 97.7 °F (36.5 °C)  Resp 16  Ht 6' 2\" (1.88 m)  Wt 81.6 kg (179 lb 14.3 oz)  SpO2 93%  BMI 23.1 kg/m2    NAD, AAOx3  Wound bed has healthy pink tissue at the base without foul smelling odor or purulent discharge. Packed with 1/4 strength Dakins      WBC improved     He will likely need to stay over the weekend since he continues to require IV pain medication for dressing changes.   No active issues other than dressing changes and pain control

## 2018-08-25 LAB
ANION GAP SERPL CALC-SCNC: 5 MMOL/L (ref 5–15)
BACTERIA SPEC CULT: ABNORMAL
BACTERIA SPEC CULT: ABNORMAL
BUN SERPL-MCNC: 5 MG/DL (ref 6–20)
BUN/CREAT SERPL: 9 (ref 12–20)
CALCIUM SERPL-MCNC: 7.8 MG/DL (ref 8.5–10.1)
CHLORIDE SERPL-SCNC: 108 MMOL/L (ref 97–108)
CO2 SERPL-SCNC: 28 MMOL/L (ref 21–32)
CREAT SERPL-MCNC: 0.57 MG/DL (ref 0.7–1.3)
GLUCOSE SERPL-MCNC: 97 MG/DL (ref 65–100)
POTASSIUM SERPL-SCNC: 3.5 MMOL/L (ref 3.5–5.1)
SERVICE CMNT-IMP: ABNORMAL
SODIUM SERPL-SCNC: 141 MMOL/L (ref 136–145)

## 2018-08-25 PROCEDURE — 65270000029 HC RM PRIVATE

## 2018-08-25 PROCEDURE — 74011250637 HC RX REV CODE- 250/637: Performed by: INTERNAL MEDICINE

## 2018-08-25 PROCEDURE — 80048 BASIC METABOLIC PNL TOTAL CA: CPT | Performed by: SURGERY

## 2018-08-25 PROCEDURE — 36415 COLL VENOUS BLD VENIPUNCTURE: CPT | Performed by: SURGERY

## 2018-08-25 PROCEDURE — 74011250636 HC RX REV CODE- 250/636: Performed by: SURGERY

## 2018-08-25 PROCEDURE — 94760 N-INVAS EAR/PLS OXIMETRY 1: CPT

## 2018-08-25 PROCEDURE — 74011000250 HC RX REV CODE- 250: Performed by: SURGERY

## 2018-08-25 PROCEDURE — 74011250636 HC RX REV CODE- 250/636: Performed by: INTERNAL MEDICINE

## 2018-08-25 RX ADMIN — METRONIDAZOLE 500 MG: 500 INJECTION, SOLUTION INTRAVENOUS at 18:45

## 2018-08-25 RX ADMIN — LEVOFLOXACIN 750 MG: 5 INJECTION, SOLUTION INTRAVENOUS at 04:30

## 2018-08-25 RX ADMIN — OXYCODONE HYDROCHLORIDE 5 MG: 5 TABLET ORAL at 08:52

## 2018-08-25 RX ADMIN — HYDROMORPHONE HYDROCHLORIDE 1 MG: 2 INJECTION, SOLUTION INTRAMUSCULAR; INTRAVENOUS; SUBCUTANEOUS at 18:44

## 2018-08-25 RX ADMIN — VANCOMYCIN HYDROCHLORIDE 1000 MG: 1 INJECTION, POWDER, LYOPHILIZED, FOR SOLUTION INTRAVENOUS at 06:04

## 2018-08-25 RX ADMIN — VANCOMYCIN HYDROCHLORIDE 1000 MG: 1 INJECTION, POWDER, LYOPHILIZED, FOR SOLUTION INTRAVENOUS at 21:10

## 2018-08-25 RX ADMIN — OXYCODONE HYDROCHLORIDE 5 MG: 5 TABLET ORAL at 04:30

## 2018-08-25 RX ADMIN — SODIUM HYPOCHLORITE: 1.25 SOLUTION TOPICAL at 10:08

## 2018-08-25 RX ADMIN — METRONIDAZOLE 500 MG: 500 INJECTION, SOLUTION INTRAVENOUS at 08:52

## 2018-08-25 RX ADMIN — VANCOMYCIN HYDROCHLORIDE 1000 MG: 1 INJECTION, POWDER, LYOPHILIZED, FOR SOLUTION INTRAVENOUS at 13:33

## 2018-08-25 RX ADMIN — OXYCODONE HYDROCHLORIDE 5 MG: 5 TABLET ORAL at 14:39

## 2018-08-25 RX ADMIN — PANTOPRAZOLE SODIUM 40 MG: 40 TABLET, DELAYED RELEASE ORAL at 07:51

## 2018-08-25 RX ADMIN — OXYCODONE HYDROCHLORIDE 5 MG: 5 TABLET ORAL at 23:01

## 2018-08-25 RX ADMIN — OXYCODONE HYDROCHLORIDE 5 MG: 5 TABLET ORAL at 00:08

## 2018-08-25 RX ADMIN — LIDOCAINE HYDROCHLORIDE: 20 JELLY TOPICAL at 10:08

## 2018-08-25 RX ADMIN — HYDROMORPHONE HYDROCHLORIDE 1 MG: 2 INJECTION, SOLUTION INTRAMUSCULAR; INTRAVENOUS; SUBCUTANEOUS at 10:13

## 2018-08-25 RX ADMIN — SODIUM CHLORIDE 100 ML/HR: 900 INJECTION, SOLUTION INTRAVENOUS at 13:30

## 2018-08-25 NOTE — PROGRESS NOTES
CRS Progress Note    Doing well. Ambulating. Pain controlled. Had a BM yesterday    /78 (BP 1 Location: Left arm, BP Patient Position: At rest)  Pulse 77  Temp 98 °F (36.7 °C)  Resp 18  Ht 6' 2\" (1.88 m)  Wt 81.6 kg (179 lb 14.3 oz)  SpO2 97%  BMI 23.1 kg/m2    NAD, AAOx3  Dressing clean and dry    Plan  -Continue IV pain meds with dressing changes. Hopefully we can wean this down next week.

## 2018-08-26 PROCEDURE — 65270000029 HC RM PRIVATE

## 2018-08-26 PROCEDURE — 74011250636 HC RX REV CODE- 250/636: Performed by: SURGERY

## 2018-08-26 PROCEDURE — 74011250636 HC RX REV CODE- 250/636: Performed by: INTERNAL MEDICINE

## 2018-08-26 PROCEDURE — 74011250637 HC RX REV CODE- 250/637: Performed by: INTERNAL MEDICINE

## 2018-08-26 PROCEDURE — 94760 N-INVAS EAR/PLS OXIMETRY 1: CPT

## 2018-08-26 PROCEDURE — 74011000250 HC RX REV CODE- 250: Performed by: SURGERY

## 2018-08-26 RX ADMIN — VANCOMYCIN HYDROCHLORIDE 1000 MG: 1 INJECTION, POWDER, LYOPHILIZED, FOR SOLUTION INTRAVENOUS at 20:07

## 2018-08-26 RX ADMIN — HYDROMORPHONE HYDROCHLORIDE 1 MG: 2 INJECTION, SOLUTION INTRAMUSCULAR; INTRAVENOUS; SUBCUTANEOUS at 08:39

## 2018-08-26 RX ADMIN — VANCOMYCIN HYDROCHLORIDE 1000 MG: 1 INJECTION, POWDER, LYOPHILIZED, FOR SOLUTION INTRAVENOUS at 07:09

## 2018-08-26 RX ADMIN — OXYCODONE HYDROCHLORIDE 5 MG: 5 TABLET ORAL at 12:19

## 2018-08-26 RX ADMIN — VANCOMYCIN HYDROCHLORIDE 1000 MG: 1 INJECTION, POWDER, LYOPHILIZED, FOR SOLUTION INTRAVENOUS at 14:35

## 2018-08-26 RX ADMIN — LEVOFLOXACIN 750 MG: 5 INJECTION, SOLUTION INTRAVENOUS at 03:30

## 2018-08-26 RX ADMIN — SODIUM HYPOCHLORITE: 1.25 SOLUTION TOPICAL at 09:09

## 2018-08-26 RX ADMIN — PANTOPRAZOLE SODIUM 40 MG: 40 TABLET, DELAYED RELEASE ORAL at 06:14

## 2018-08-26 RX ADMIN — HYDROMORPHONE HYDROCHLORIDE 1 MG: 2 INJECTION, SOLUTION INTRAMUSCULAR; INTRAVENOUS; SUBCUTANEOUS at 16:04

## 2018-08-26 RX ADMIN — HYDROMORPHONE HYDROCHLORIDE 1 MG: 2 INJECTION, SOLUTION INTRAMUSCULAR; INTRAVENOUS; SUBCUTANEOUS at 20:07

## 2018-08-26 RX ADMIN — SODIUM CHLORIDE 100 ML/HR: 900 INJECTION, SOLUTION INTRAVENOUS at 07:09

## 2018-08-26 RX ADMIN — METRONIDAZOLE 500 MG: 500 INJECTION, SOLUTION INTRAVENOUS at 06:12

## 2018-08-26 RX ADMIN — OXYCODONE HYDROCHLORIDE 5 MG: 5 TABLET ORAL at 23:27

## 2018-08-26 RX ADMIN — LIDOCAINE HYDROCHLORIDE: 20 JELLY TOPICAL at 09:09

## 2018-08-26 RX ADMIN — OXYCODONE HYDROCHLORIDE 5 MG: 5 TABLET ORAL at 03:29

## 2018-08-26 NOTE — PROGRESS NOTES
Problem: Falls - Risk of  Goal: *Absence of Falls  Document Alon Fall Risk and appropriate interventions in the flowsheet.    Outcome: Progressing Towards Goal  Fall Risk Interventions:  Mobility Interventions: Patient to call before getting OOB         Medication Interventions: Teach patient to arise slowly    Elimination Interventions: Call light in reach, Urinal in reach

## 2018-08-26 NOTE — PROGRESS NOTES
CRS  Pt feels more sore today  Flowsheet reviewed  Perineum: no crepitus, no drainage    Plan:  Cont iv atbx- adjust according to sensitivity.  Wound care

## 2018-08-27 LAB
DATE LAST DOSE: ABNORMAL
REPORTED DOSE,DOSE: ABNORMAL UNITS
REPORTED DOSE/TIME,TMG: 2100
VANCOMYCIN TROUGH SERPL-MCNC: 19.5 UG/ML (ref 5–10)

## 2018-08-27 PROCEDURE — 74011250637 HC RX REV CODE- 250/637: Performed by: INTERNAL MEDICINE

## 2018-08-27 PROCEDURE — 80202 ASSAY OF VANCOMYCIN: CPT | Performed by: SURGERY

## 2018-08-27 PROCEDURE — 65270000029 HC RM PRIVATE

## 2018-08-27 PROCEDURE — 74011250636 HC RX REV CODE- 250/636: Performed by: INTERNAL MEDICINE

## 2018-08-27 PROCEDURE — 36415 COLL VENOUS BLD VENIPUNCTURE: CPT | Performed by: SURGERY

## 2018-08-27 PROCEDURE — 74011000250 HC RX REV CODE- 250: Performed by: SURGERY

## 2018-08-27 PROCEDURE — 74011250636 HC RX REV CODE- 250/636: Performed by: SURGERY

## 2018-08-27 PROCEDURE — 94760 N-INVAS EAR/PLS OXIMETRY 1: CPT

## 2018-08-27 PROCEDURE — 74011250637 HC RX REV CODE- 250/637: Performed by: SURGERY

## 2018-08-27 RX ORDER — HYDROMORPHONE HYDROCHLORIDE 2 MG/1
4 TABLET ORAL
Status: DISCONTINUED | OUTPATIENT
Start: 2018-08-27 | End: 2018-08-29 | Stop reason: HOSPADM

## 2018-08-27 RX ORDER — HYDROMORPHONE HYDROCHLORIDE 2 MG/1
2 TABLET ORAL
Status: DISCONTINUED | OUTPATIENT
Start: 2018-08-27 | End: 2018-08-29 | Stop reason: HOSPADM

## 2018-08-27 RX ADMIN — LIDOCAINE HYDROCHLORIDE: 20 JELLY TOPICAL at 10:33

## 2018-08-27 RX ADMIN — SODIUM HYPOCHLORITE: 1.25 SOLUTION TOPICAL at 10:33

## 2018-08-27 RX ADMIN — OXYCODONE HYDROCHLORIDE 5 MG: 5 TABLET ORAL at 12:00

## 2018-08-27 RX ADMIN — METRONIDAZOLE 500 MG: 500 INJECTION, SOLUTION INTRAVENOUS at 18:18

## 2018-08-27 RX ADMIN — HYDROMORPHONE HYDROCHLORIDE 4 MG: 2 TABLET ORAL at 20:16

## 2018-08-27 RX ADMIN — HYDROMORPHONE HYDROCHLORIDE 1 MG: 2 INJECTION, SOLUTION INTRAMUSCULAR; INTRAVENOUS; SUBCUTANEOUS at 09:44

## 2018-08-27 RX ADMIN — VANCOMYCIN HYDROCHLORIDE 1000 MG: 1 INJECTION, POWDER, LYOPHILIZED, FOR SOLUTION INTRAVENOUS at 20:16

## 2018-08-27 RX ADMIN — OXYCODONE HYDROCHLORIDE 5 MG: 5 TABLET ORAL at 07:46

## 2018-08-27 RX ADMIN — SODIUM CHLORIDE 100 ML/HR: 900 INJECTION, SOLUTION INTRAVENOUS at 18:13

## 2018-08-27 RX ADMIN — PANTOPRAZOLE SODIUM 40 MG: 40 TABLET, DELAYED RELEASE ORAL at 07:43

## 2018-08-27 RX ADMIN — OXYCODONE HYDROCHLORIDE 5 MG: 5 TABLET ORAL at 03:32

## 2018-08-27 RX ADMIN — METRONIDAZOLE 500 MG: 500 INJECTION, SOLUTION INTRAVENOUS at 10:33

## 2018-08-27 RX ADMIN — OXYCODONE HYDROCHLORIDE 5 MG: 5 TABLET ORAL at 15:53

## 2018-08-27 RX ADMIN — VANCOMYCIN HYDROCHLORIDE 1000 MG: 1 INJECTION, POWDER, LYOPHILIZED, FOR SOLUTION INTRAVENOUS at 14:04

## 2018-08-27 RX ADMIN — VANCOMYCIN HYDROCHLORIDE 1000 MG: 1 INJECTION, POWDER, LYOPHILIZED, FOR SOLUTION INTRAVENOUS at 07:39

## 2018-08-27 RX ADMIN — LEVOFLOXACIN 750 MG: 5 INJECTION, SOLUTION INTRAVENOUS at 06:00

## 2018-08-27 NOTE — PROGRESS NOTES
Problem: Pressure Injury - Risk of  Goal: *Prevention of pressure injury  Document Landen Scale and appropriate interventions in the flowsheet.    Outcome: Progressing Towards Goal  Pressure Injury Interventions:       Moisture Interventions: Absorbent underpads    Activity Interventions: Increase time out of bed    Mobility Interventions: HOB 30 degrees or less    Nutrition Interventions: Document food/fluid/supplement intake, Discuss nutritional consult with provider    Friction and Shear Interventions: Apply protective barrier, creams and emollients, Lift sheet

## 2018-08-27 NOTE — PROGRESS NOTES
Nutrition Assessment:    INTERVENTIONS/RECOMMENDATIONS:   Meals/Snacks: General/healthful diet:  Continue regular, liberalized diet. No restrictions for optimal nutritional intake. Supplements: Commercial supplement:  Continue Ensure Enlive shakes po TID with meals. At 100% intake, will provide an additional 1050 kcals/60 g protein. ASSESSMENT:   8/27:  Chart reviewed; visited with pt at bedside. Pt reports that appetite has improved, tolerating a regular diet + ensure enlive shakes. Pt likes all flavors of the ensure; no preference. Pt reports that he initially did not want to eat on admission because he did not want to have persistent loose stools as painful around the wound site. However, this has improved and pt eating a regular diet. Pt did not have any questions related to nutrition. Pt is receiving wound care and pain meds at least 1 hour before wound care. 8/22:  Chart reviewed; med noted for sepsis and perianal abscess on admission. RD received nutrition consult from wound care with request for ensure shakes. Pt admitted on a regular diet then changed to a regular with 40 g protein restriction. However, pt has increased energy/protein needs to support wound healing. Ensure shakes were also ordered per pt request as pt did not want breakfast tray. Attempted to visit with pt at bedside, but pt sleeping soundly. Diet Order: Regular  % Eaten:  Patient Vitals for the past 72 hrs:   % Diet Eaten   08/25/18 1555 25 %     Pertinent Medications: [x] Reviewed []Other:  Pertinent Labs: [x]Reviewed  []Other:  Food Allergies: []None [x]Other: shellfish    Last BM:    [x]Active     []Hyperactive  []Hypoactive       [] Absent  BS  Skin:    [] Intact   [] Incision  [x] Breakdown   []Edema   []Other:    Anthropometrics: Height: 6' 2\" (188 cm) Weight: 81.6 kg (179 lb 14.3 oz)    IBW (%IBW):   ( ) UBW (%UBW):   (  %)    BMI: Body mass index is 23.1 kg/(m^2).     This BMI is indicative of:  []Underweight [x]Normal   []Overweight   [] Obesity   [] Extreme Obesity (BMI>40)  Last Weight Metrics:  Weight Loss Metrics 8/21/2018 8/21/2018   Today's Wt 179 lb 14.3 oz 180 lb   BMI 23.1 kg/m2 23.11 kg/m2       Estimated Nutrition Needs (Based on): 2308 Kcals/day (BMR (1776) x 1. 3AF) , 98 g (1.2 g/kg bw) Protein  Carbohydrate: At Least 130 g/day  Fluids: 2300 mL/day    NUTRITION DIAGNOSES:   Problem:  Inadequate oral intake      Etiology: related to suspected decreased appetite     Signs/Symptoms: as evidenced by requested ensure shakes with meals, increased wound healing needs    Previous Nutrition Dx:  [x] Resolved  [] Unresolved           [] Progressing    NUTRITION INTERVENTIONS:  Meals/Snacks: General/healthful diet   Supplements: Commercial supplement              GOAL:   PO intake trend >50% of meals + consume 480 ml Ensure daily next 3-5 days    NUTRITION MONITORING AND EVALUATION   Food/Nutrient Intake Outcomes:  Total energy intake, Protein intake  Physical Signs/Symptoms Outcomes: Weight/weight change    Previous Goal Met:   [x] Met              [] Progressing Towards Goal              [] Not Progressing Towards Goal   Previous Recommendations:   [x] Implemented          [] Not Implemented          [] Not Applicable    LEARNING NEEDS (Diet, Food/Nutrient-Drug Interaction):    [x] None Identified   [] Identified and Education Provided/Documented   [] Identified and Pt declined/was not appropriate     Cultural, Rastafarian, OR Ethnic Dietary Needs:    [x] None Identified   [] Identified and Addressed     [x] Interdisciplinary Care Plan Reviewed/Documented    [x] Discharge Planning:  Continue regular diet + Ensure shakes 2-3 times daily   [] Participated in Interdisciplinary Rounds    NUTRITION RISK:    [x] Patient At Nutritional Risk     [] Patient Not At [address] [place] [address]  Pager [phone]  Weekend Pager [phone]

## 2018-08-28 PROCEDURE — 74011250637 HC RX REV CODE- 250/637: Performed by: INTERNAL MEDICINE

## 2018-08-28 PROCEDURE — 65270000029 HC RM PRIVATE

## 2018-08-28 PROCEDURE — 77030009526 HC GEL CARSYN MDII -A

## 2018-08-28 PROCEDURE — 74011250637 HC RX REV CODE- 250/637: Performed by: SURGERY

## 2018-08-28 PROCEDURE — 77030011256 HC DRSG MEPILEX <16IN NO BORD MOLN -A

## 2018-08-28 PROCEDURE — 74011250636 HC RX REV CODE- 250/636: Performed by: INTERNAL MEDICINE

## 2018-08-28 PROCEDURE — 74011000250 HC RX REV CODE- 250: Performed by: SURGERY

## 2018-08-28 RX ADMIN — SODIUM HYPOCHLORITE: 1.25 SOLUTION TOPICAL at 09:02

## 2018-08-28 RX ADMIN — HYDROMORPHONE HYDROCHLORIDE 4 MG: 2 TABLET ORAL at 04:15

## 2018-08-28 RX ADMIN — LIDOCAINE HYDROCHLORIDE: 20 JELLY TOPICAL at 09:01

## 2018-08-28 RX ADMIN — VANCOMYCIN HYDROCHLORIDE 1000 MG: 1 INJECTION, POWDER, LYOPHILIZED, FOR SOLUTION INTRAVENOUS at 04:59

## 2018-08-28 RX ADMIN — SODIUM CHLORIDE 100 ML/HR: 900 INJECTION, SOLUTION INTRAVENOUS at 19:44

## 2018-08-28 RX ADMIN — HYDROMORPHONE HYDROCHLORIDE 4 MG: 2 TABLET ORAL at 20:26

## 2018-08-28 RX ADMIN — PANTOPRAZOLE SODIUM 40 MG: 40 TABLET, DELAYED RELEASE ORAL at 06:38

## 2018-08-28 RX ADMIN — SODIUM CHLORIDE 100 ML/HR: 900 INJECTION, SOLUTION INTRAVENOUS at 10:50

## 2018-08-28 RX ADMIN — HYDROMORPHONE HYDROCHLORIDE 4 MG: 2 TABLET ORAL at 00:09

## 2018-08-28 RX ADMIN — HYDROMORPHONE HYDROCHLORIDE 4 MG: 2 TABLET ORAL at 16:46

## 2018-08-28 RX ADMIN — HYDROMORPHONE HYDROCHLORIDE 4 MG: 2 TABLET ORAL at 08:14

## 2018-08-28 RX ADMIN — HYDROMORPHONE HYDROCHLORIDE 4 MG: 2 TABLET ORAL at 12:43

## 2018-08-28 NOTE — PROGRESS NOTES
Bedside shift change report given to Toña Leon (oncoming nurse) and Lesley Mijares (oncoming student nurse) by Martín Kwan (offgoing nurse). Report included the following information SBAR and Kardex.

## 2018-08-28 NOTE — WOUND CARE
Wound Care dressing change: Patient seen with Dr Yovany Smith today to see wounds and make a plan for discharge and care. Patient has hidradenitis suppurativa, newly diagnosed. Caused abscess of perineum and scrotum with some tunneling abscess to right and left buttock. Patient is on disability for schizophrenia (states he does not take medication for it) and has medicaid and medicare. Patient will need home health for daily wound care and will follow up with Dr Yovany Smith in his office weekly until perineal/scrotal abscess surgical site is healed. WOUND POA CONDITIONS Wound Buttocks Right;Left (Active) DRESSING STATUS Changed per order 8/28/2018  9:20 AM  
DRESSING TYPE Mesh pants;4 x 4;ABD pad 8/22/2018  8:46 AM  
Non-Pressure Injury Full thickness (subcut/muscle) 8/24/2018  1:53 PM  
Wound Length (cm) 2 cm 8/28/2018  9:20 AM  
Wound Width (cm) 2 cm 8/28/2018  9:20 AM  
Wound Depth (cm) 0.3 8/28/2018  9:20 AM  
Wound Surface area (cm^2) 4 cm^2 8/28/2018  9:20 AM  
Condition of Base Granulation 8/28/2018  9:20 AM  
Condition of Edges Open 8/28/2018  9:18 AM  
Tissue Type Pink;Red 8/28/2018  9:18 AM  
Tissue Type Percent Pink 100 8/22/2018 10:41 AM  
Direction of Tunnels 6    oclock 8/28/2018  9:20 AM  
Depth of Tunnel/Sinus (cm) 4 cm 8/28/2018  9:20 AM  
Drainage Amount  Small  8/28/2018  9:20 AM  
Drainage Color Serosanguinous 8/28/2018  9:20 AM  
Wound Odor None 8/28/2018  9:20 AM  
Periwound Skin Condition Intact 8/28/2018  9:20 AM  
Cleansing and Cleansing Agents  Sodium hypochlorite 25 % 8/28/2018  9:20 AM  
Dressing Type Applied Packing;Moist to dry; Foam 8/28/2018  9:20 AM  
Procedure Tolerated Well 8/28/2018  9:20 AM  
Number of days:7 Wound Scrotum Anterior;Posterior (Active) DRESSING STATUS Clean, dry, and intact 8/28/2018  9:34 AM  
DRESSING TYPE Packing; Foam 8/28/2018  9:34 AM  
Non-Pressure Injury Full thickness (subcut/muscle) 8/24/2018  1:53 PM  
Wound Length (cm) 16 cm 8/28/2018  9:20 AM  
 Wound Width (cm) 1.5 cm 8/28/2018  9:20 AM  
Wound Depth (cm) 1.5 8/28/2018  9:20 AM  
Wound Surface area (cm^2) 24 cm^2 8/28/2018  9:20 AM  
Condition of Base Marble 8/28/2018  9:34 AM  
Condition of Edges Open 8/28/2018  9:34 AM  
Tissue Type Red;Pink 8/28/2018  9:34 AM  
Direction of Tunnels 6    oclock 8/23/2018  2:19 PM  
Drainage Amount  Small  8/28/2018  9:34 AM  
Drainage Color Green;Serosanguinous 8/28/2018  9:34 AM  
Wound Odor None 8/28/2018  9:34 AM  
Periwound Skin Condition Macerated 8/28/2018  9:20 AM  
Cleansing and Cleansing Agents  Sodium hypochlorite 25 % 8/28/2018  9:20 AM  
Dressing Type Applied Moist to dry;Packing 8/28/2018  9:20 AM  
Procedure Tolerated Fair 8/28/2018  9:20 AM  
Number of days:7 Recommend: 
 
Scrotal/perineum wound: daily, cleanse with 1/4 strength Dakins solution moist 4x4's. Pack wound with NS moist kerlix gauze and cover with a clean fracisco-pad, secured with briefs. Right and left buttock wounds: daily cleanse with 1/4 strength Dakins moist 4x4's and pack each with 1/4-1/2\" Dakins moist packing strips. Cover with small foam dressing.  
 
Morteza Ramirez RN, [employer]

## 2018-08-28 NOTE — PROGRESS NOTES
Per MD, patient will need HH at d/c. Patient will need daily wound care changes. Patient may be ready for discharge tomorrow. Alexsander Perez Ext [phone]

## 2018-08-28 NOTE — PROGRESS NOTES
0700: Vital signs taken per unit order. /79; nurse notified of BP out of range. 0815: PO Dilaudid given prior to wound dressing change. 0900: Wound care nurse in room. Dressings changed, patient tolerated well. Patient given packet on Hidradenitis Suppurative (diagnosis).

## 2018-08-28 NOTE — PROGRESS NOTES
Problem: Falls - Risk of 
Goal: *Absence of Falls Document Gela Camarillo Fall Risk and appropriate interventions in the flowsheet. Fall Risk Interventions: 
Mobility Interventions: Patient to call before getting OOB Medication Interventions: Patient to call before getting OOB Elimination Interventions: Call light in reach Problem: Pressure Injury - Risk of 
Goal: *Prevention of pressure injury Document Landen Scale and appropriate interventions in the flowsheet. Pressure Injury Interventions: 
  
 
Moisture Interventions: Absorbent underpads, Offer toileting Q_hr Activity Interventions: Increase time out of bed, Pressure redistribution bed/mattress(bed type) Mobility Interventions: HOB 30 degrees or less Nutrition Interventions: Document food/fluid/supplement intake Friction and Shear Interventions: Apply protective barrier, creams and emollients, HOB 30 degrees or less

## 2018-08-28 NOTE — PROGRESS NOTES
General Surgery End of Shift Nursing Note Bedside shift change report given to *** (oncoming nurse) by Ruth Estrada(offgoing nurse). Report included the following information SBAR. Shift worked:   7am-7pm  
Summary of shift:   Wound care done by [facility] team this morning Issues for physician to address:     
 
Number times ambulated in hallway past shift: 0 Number of times OOB to chair past shift: 0 Pain Management: 
Current medication: *** Patient states pain is manageable on current pain medication: {YES/NO:38415} GI: 
 
Current diet:  DIET REGULAR 
DIET NUTRITIONAL SUPPLEMENTS No; Breakfast, Lunch, Dinner; Ensure [employer] Tolerating current diet: {YES/NO:43559} Passing flatus: {YES/NO:11244} Last Bowel Movement: {Time; today/yest/etc:01739} Appearance: *** Respiratory: 
 
Incentive Spirometer at bedside: {YES/NO:68762} Patient instructed on use: {YES/NO:72273} Patient Safety: 
 
Falls Score: {Numbers; 1-4 :[id number]} Bed Alarm On? {YES/NO:60073} Sitter? {YES/NO:33543} Anjali Granados

## 2018-08-28 NOTE — PROGRESS NOTES
Spiritual Care Assessment/Progress Note [address] 
 
 
NAME: Deana Steele      MRN: [id number] AGE: 39 y.o. SEX: male Mormon Affiliation: No preference Language: English  
 
8/28/2018     Total Time (in minutes): 5 Spiritual Assessment begun in [facility] GENERAL SURGERY through conversation with: 
  
    [x]Patient        [] Family    [] Friend(s) Reason for Consult: Initial/Spiritual assessment, patient floor Spiritual beliefs: (Please include comment if needed) 
   [] Identifies with a vicente tradition:     
   [] Supported by a vicente community:        
   [x] Claims no spiritual orientation:       
   [] Seeking spiritual identity:            
   [] Adheres to an individual form of spirituality:       
   [] Not able to assess:                   
 
    
Identified resources for coping:  
   [] Prayer                           
   [] Music                  [] Guided Imagery [x] Family/friends                 [] Pet visits [] Devotional reading                         [] Unknown 
   [x] Other: Dogs Interventions offered during this visit: (See comments for more details) Patient Interventions: Initial/Spiritual assessment, patient floor, Coping skills reviewed/reinforced Plan of Care: 
 
 [] Support spiritual and/or cultural needs  
 [] Support AMD and/or advance care planning process    
 [] Support grieving process 
 [] Coordinate Rites and/or Rituals  
 [] Coordination with community clergy 
 [x] No spiritual needs identified at this time 
 [] Detailed Plan of Care below (See Comments)  [] Make referral to Music Therapy [x] Make referral to Pet Therapy    
[] Make referral to Addiction services 
[] Make referral to [facility] 
[] Make referral to Spiritual Care Partner 
[] No future visits requested       
[] Follow up visits as needed Initial Spiritual Assessment in 2107 with LOS pt. Introduced self and role, pt awake and alert watching television. Led in processing. Pt indicated that he's hoping to go home within the next two days. Missing his dogs and girlfriend most. Pt self-reported to be coping well and appeared calm and comfortable. Did not express any specific needs at this time. Extended blessing and well wishes concluding visit due to incoming rounds. Will follow up as needed. Made referral for pet therapy visit. Leonette Gitelman, M. Myriam Yuan

## 2018-08-29 VITALS
RESPIRATION RATE: 16 BRPM | BODY MASS INDEX: 23.09 KG/M2 | SYSTOLIC BLOOD PRESSURE: 137 MMHG | TEMPERATURE: 98.1 F | HEIGHT: 74 IN | OXYGEN SATURATION: 98 % | DIASTOLIC BLOOD PRESSURE: 79 MMHG | WEIGHT: 179.9 LBS | HEART RATE: 93 BPM

## 2018-08-29 PROCEDURE — 77030011256 HC DRSG MEPILEX <16IN NO BORD MOLN -A

## 2018-08-29 PROCEDURE — 74011250637 HC RX REV CODE- 250/637: Performed by: SURGERY

## 2018-08-29 PROCEDURE — 74011250636 HC RX REV CODE- 250/636: Performed by: INTERNAL MEDICINE

## 2018-08-29 PROCEDURE — 74011000250 HC RX REV CODE- 250: Performed by: SURGERY

## 2018-08-29 PROCEDURE — 74011250637 HC RX REV CODE- 250/637: Performed by: INTERNAL MEDICINE

## 2018-08-29 PROCEDURE — 94760 N-INVAS EAR/PLS OXIMETRY 1: CPT

## 2018-08-29 RX ORDER — LIDOCAINE HYDROCHLORIDE 20 MG/ML
JELLY TOPICAL ONCE
Status: COMPLETED | OUTPATIENT
Start: 2018-08-29 | End: 2018-08-29

## 2018-08-29 RX ORDER — HYDROMORPHONE HYDROCHLORIDE 4 MG/1
4 TABLET ORAL
Qty: 30 TAB | Refills: 0 | Status: SHIPPED | OUTPATIENT
Start: 2018-08-29

## 2018-08-29 RX ADMIN — SODIUM HYPOCHLORITE: 1.25 SOLUTION TOPICAL at 09:16

## 2018-08-29 RX ADMIN — PANTOPRAZOLE SODIUM 40 MG: 40 TABLET, DELAYED RELEASE ORAL at 06:42

## 2018-08-29 RX ADMIN — LIDOCAINE HYDROCHLORIDE: 20 JELLY TOPICAL at 09:16

## 2018-08-29 RX ADMIN — HYDROMORPHONE HYDROCHLORIDE 4 MG: 2 TABLET ORAL at 04:43

## 2018-08-29 RX ADMIN — SODIUM CHLORIDE 100 ML/HR: 900 INJECTION, SOLUTION INTRAVENOUS at 04:44

## 2018-08-29 RX ADMIN — HYDROMORPHONE HYDROCHLORIDE 4 MG: 2 TABLET ORAL at 09:16

## 2018-08-29 RX ADMIN — HYDROMORPHONE HYDROCHLORIDE 4 MG: 2 TABLET ORAL at 00:32

## 2018-08-29 RX ADMIN — LIDOCAINE HYDROCHLORIDE: 20 JELLY TOPICAL at 10:59

## 2018-08-29 NOTE — DISCHARGE INSTRUCTIONS
Perineal Abscess: Care Instructions  Your Care Instructions  A perineal abscess is an infection that causes a painful lump in the perineum. The perineum is the area between the scrotum and the anus in a man. In a woman, it's the area between the vulva and the anus. The area may look red and feel painful and be swollen. The abscess may form after surgery or after delivery of a baby. It can also be caused by an infection of the prostate gland. The prostate gland is an organ found inside a man's body, just below the bladder. Your doctor may have done minor surgery to open and drain the abscess. You may have had a sedative to help you relax. You may be unsteady after having sedation. It can take a few hours for the medicine's effects to wear off. Common side effects include nausea, vomiting, and feeling sleepy or tired. The doctor has checked you carefully, but problems can develop later. If you notice any problems or new symptoms, get medical treatment right away. Follow-up care is a key part of your treatment and safety. Be sure to make and go to all appointments, and call your doctor if you are having problems. It's also a good idea to know your test results and keep a list of the medicines you take. How can you care for yourself at home? · If your doctor gave you a sedative:  ¨ For 24 hours, don't do anything that requires attention to detail. It takes time for the medicine's effects to completely wear off. ¨ For your safety, do not drive or operate any machinery that could be dangerous. Wait until the medicine wears off. You need to be able to think clearly and react easily. · Be safe with medicines. Read and follow all instructions on the label. ¨ If the doctor gave you a prescription medicine for pain, take it as prescribed. ¨ If you are not taking a prescription pain medicine, ask your doctor if you can take an over-the-counter medicine.   · If your doctor prescribed antibiotics, take them as directed. Do not stop taking them just because you feel better. You need to take the full course of antibiotics. · Sit in a few inches of warm water (sitz bath) 3 times a day and after bowel movements. The warm water helps the area heal and eases discomfort. When should you call for help? Call 911 anytime you think you may need emergency care. For example, call if:    · You have trouble breathing.     · You passed out (lost consciousness).    Call your doctor now or seek immediate medical care if:    · You have symptoms of infection, such as:  ¨ Increased pain, swelling, warmth, or redness. ¨ Red streaks leading from the area. ¨ Pus draining from the area. ¨ A fever.    Watch closely for changes in your health, and be sure to contact your doctor if:    · You do not get better as expected. Where can you learn more? Go to http://rossana-isaiah.info/. Enter 96 [id number] in the search box to learn more about \"Perineal Abscess: Care Instructions. \"  Current as of: May 12, 2017  Content Version: 11.7  © 4396-3244 [employer]wise, [employer]orporated. Care instructions adapted under license by [employer] (which disclaims liability or warranty for this information). If you have questions about a medical condition or this instruction, always ask your healthcare professional. [address] any warranty or liability for your use of this information.

## 2018-08-29 NOTE — PROGRESS NOTES
Bedside and Verbal shift change report given to Asher Webster (oncoming nurse) by Sabrina Gaston (offgoing nurse). Report included the following information SBAR, Kardex, Intake/Output, MAR and Med Rec Status. Wound care done. Patient tolerated it well. Spoke with MD- Dr. Claudia Jacobs office will call in the Dakins solution and the lidocane jelly. Patient and pharmacy aware.

## 2018-08-29 NOTE — PROGRESS NOTES
CM sent a referral to [facility] yesterday. 
 
9:06am 
CM phoned Beatrice to follow-up on referral and was informed that it was under review and CM should hear something soon. [facility] has accepted patient and will start providing wound care tomorrow. Beatrice did inform CM that the patient will need a script for the [address] CM notified RN that patient will need a script for [place] at discharge. Patient is being discharged home today without any needs or concerns. Care Management Interventions PCP Verified by CM: Yes (no PCP , goes to ED when needed. open to [facility] PCP list, given) Mode of Transport at Discharge: Other (see comment) (father or girlfriend will tranport to home at discharge) Transition of Care Consult (CM Consult): Home Health [address] No 
Reason Outside [place]: Out of service area Discharge Durable Medical Equipment: No (none at home) Physical Therapy Consult: Yes Occupational Therapy Consult: Yes Speech Therapy Consult: No 
Current Support Network: Own Home, Lives with Spouse (lives in a 1 story home with 4 steps into entrance with girlfriend and his teenage son) Confirm Follow Up Transport: Family (father or girlfriend will transport to follow up appt) Plan discussed with Pt/Family/Caregiver: Yes Freedom of Choice Offered: Yes Discharge Location Discharge Placement: Home with home health ([facility]) Peter Dillon Ext [phone]

## 2018-09-05 NOTE — PROGRESS NOTES
TED received a call from patients home health provider, [facility], requesting a d/c summary for insurance purposes. TED was unable to locate a d/c summary in the system. TED left a message with Dr Daron Chavez office requesting a d/c summary be faxed to the home health agency at [phone]. Antonina Rosario Ext [phone]

## 2018-09-12 NOTE — DISCHARGE SUMMARY
Discharge Summary     Patient: Deanna Hills MRN: [id number]  SSN: [id number]    YOB: 1975  Age: 39 y.o. Sex: male       Admit Date: 8/21/2018    Discharge Date: 8/29/2018      Admission Diagnoses: Sepsis [facility])  Perianal Abscess    Discharge Diagnoses:   Problem List as of 8/29/2018  Date Reviewed: 8/21/2018          Codes Class Noted - Resolved    Sepsis ([facility] [address] ICD-10-CM: A41.9  ICD-9-CM: 038.9, 995.91  8/21/2018 - Present        Gluteal abscess ICD-10-CM: L02.31  ICD-9-CM: 682.5  8/21/2018 - Present        Scrotal abscess ICD-10-CM: N49.2  ICD-9-CM: 608.4  8/21/2018 - Present               Discharge Condition: Good    Hospital Course: Presented with perirectal abscess, perineal abscess and jeniffer;s gangrene. Taken to OR for debridement and I&D. Postoperatively he required pain medication for dressing changes and eventually discharged with wet to dry dressing    Disposition: home    Discharge Medications:   Cannot display discharge medications since this patient is not currently admitted. Activity: Activity as tolerated  Diet: Regular Diet  Wound Care: Wet to dry dressing    No orders of the defined types were placed in this encounter.       Signed By: Tadeo Briseno MD     September 12, 2018

## 2018-09-12 NOTE — PROGRESS NOTES
TED received a call from Alison Adan with [facility] that they still have not recieved a D/C summary for pt. CM phoned Dr Antunez's office, explained situation and was told that the MD will get a reminder today, after clinicals, to complete a d/c summary. Laura Barillas Ext [phone]

## 2022-03-19 PROBLEM — L02.31 GLUTEAL ABSCESS: Status: ACTIVE | Noted: 2018-08-21

## 2022-03-19 PROBLEM — N49.2 SCROTAL ABSCESS: Status: ACTIVE | Noted: 2018-08-21

## 2022-03-19 PROBLEM — A41.9 SEPSIS (HCC): Status: ACTIVE | Noted: 2018-08-21

## 2023-11-13 ENCOUNTER — HOSPITAL ENCOUNTER (INPATIENT)
Facility: HOSPITAL | Age: 48
LOS: 3 days | Discharge: NURSING, REHAB OR LONG-TERM CARE | DRG: 603 | End: 2023-11-17
Attending: FAMILY MEDICINE | Admitting: INTERNAL MEDICINE
Payer: MEDICARE

## 2023-11-13 ENCOUNTER — APPOINTMENT (OUTPATIENT)
Facility: HOSPITAL | Age: 48
DRG: 603 | End: 2023-11-13
Payer: MEDICARE

## 2023-11-13 DIAGNOSIS — G89.29 CHRONIC PAIN OF RIGHT KNEE: Chronic | ICD-10-CM

## 2023-11-13 DIAGNOSIS — M25.561 CHRONIC PAIN OF RIGHT KNEE: Chronic | ICD-10-CM

## 2023-11-13 DIAGNOSIS — L03.317 CELLULITIS, GLUTEAL, LEFT: Primary | ICD-10-CM

## 2023-11-13 PROBLEM — L03.90 CELLULITIS: Status: ACTIVE | Noted: 2023-11-13

## 2023-11-13 LAB
ALBUMIN SERPL-MCNC: 3.6 G/DL (ref 3.5–5)
ALBUMIN/GLOB SERPL: 0.8 (ref 1.1–2.2)
ALP SERPL-CCNC: 107 U/L (ref 45–117)
ALT SERPL-CCNC: 49 U/L (ref 12–78)
AMPHET UR QL SCN: NEGATIVE
ANION GAP SERPL CALC-SCNC: 10 MMOL/L (ref 5–15)
APAP SERPL-MCNC: <2 UG/ML (ref 10–30)
APPEARANCE UR: CLEAR
AST SERPL-CCNC: 36 U/L (ref 15–37)
BACTERIA URNS QL MICRO: NEGATIVE /HPF
BARBITURATES UR QL SCN: NEGATIVE
BASOPHILS # BLD: 0.1 K/UL (ref 0–0.1)
BASOPHILS NFR BLD: 1 % (ref 0–1)
BENZODIAZ UR QL: NEGATIVE
BILIRUB SERPL-MCNC: 0.3 MG/DL (ref 0.2–1)
BILIRUB UR QL: NEGATIVE
BUN SERPL-MCNC: 9 MG/DL (ref 6–20)
BUN/CREAT SERPL: 9 (ref 12–20)
CALCIUM SERPL-MCNC: 9.1 MG/DL (ref 8.5–10.1)
CANNABINOIDS UR QL SCN: POSITIVE
CHLORIDE SERPL-SCNC: 102 MMOL/L (ref 97–108)
CO2 SERPL-SCNC: 26 MMOL/L (ref 21–32)
COCAINE UR QL SCN: NEGATIVE
COLOR UR: NORMAL
CREAT SERPL-MCNC: 0.96 MG/DL (ref 0.7–1.3)
DIFFERENTIAL METHOD BLD: ABNORMAL
EOSINOPHIL # BLD: 0.4 K/UL (ref 0–0.4)
EOSINOPHIL NFR BLD: 3 % (ref 0–7)
EPITH CASTS URNS QL MICRO: NORMAL /LPF
ERYTHROCYTE [DISTWIDTH] IN BLOOD BY AUTOMATED COUNT: 13.2 % (ref 11.5–14.5)
ETHANOL SERPL-MCNC: <10 MG/DL (ref 0–0.08)
FLUAV RNA SPEC QL NAA+PROBE: NOT DETECTED
FLUBV RNA SPEC QL NAA+PROBE: NOT DETECTED
GLOBULIN SER CALC-MCNC: 4.6 G/DL (ref 2–4)
GLUCOSE SERPL-MCNC: 111 MG/DL (ref 65–100)
GLUCOSE UR STRIP.AUTO-MCNC: NEGATIVE MG/DL
HCT VFR BLD AUTO: 51.5 % (ref 36.6–50.3)
HGB BLD-MCNC: 17.2 G/DL (ref 12.1–17)
HGB UR QL STRIP: NEGATIVE
IMM GRANULOCYTES # BLD AUTO: 0.1 K/UL (ref 0–0.04)
IMM GRANULOCYTES NFR BLD AUTO: 0 % (ref 0–0.5)
KETONES UR QL STRIP.AUTO: NEGATIVE MG/DL
LEUKOCYTE ESTERASE UR QL STRIP.AUTO: NEGATIVE
LYMPHOCYTES # BLD: 2.7 K/UL (ref 0.8–3.5)
LYMPHOCYTES NFR BLD: 21 % (ref 12–49)
Lab: ABNORMAL
MCH RBC QN AUTO: 31.5 PG (ref 26–34)
MCHC RBC AUTO-ENTMCNC: 33.4 G/DL (ref 30–36.5)
MCV RBC AUTO: 94.3 FL (ref 80–99)
METHADONE UR QL: NEGATIVE
MONOCYTES # BLD: 1.1 K/UL (ref 0–1)
MONOCYTES NFR BLD: 8 % (ref 5–13)
NEUTS SEG # BLD: 8.9 K/UL (ref 1.8–8)
NEUTS SEG NFR BLD: 67 % (ref 32–75)
NITRITE UR QL STRIP.AUTO: NEGATIVE
NRBC # BLD: 0 K/UL (ref 0–0.01)
NRBC BLD-RTO: 0 PER 100 WBC
OPIATES UR QL: NEGATIVE
PCP UR QL: NEGATIVE
PH UR STRIP: 6 (ref 5–8)
PLATELET # BLD AUTO: 323 K/UL (ref 150–400)
PMV BLD AUTO: 9.1 FL (ref 8.9–12.9)
POTASSIUM SERPL-SCNC: 4.2 MMOL/L (ref 3.5–5.1)
PROT SERPL-MCNC: 8.2 G/DL (ref 6.4–8.2)
PROT UR STRIP-MCNC: NEGATIVE MG/DL
RBC # BLD AUTO: 5.46 M/UL (ref 4.1–5.7)
RBC #/AREA URNS HPF: NORMAL /HPF (ref 0–5)
SALICYLATES SERPL-MCNC: 7.1 MG/DL (ref 2.8–20)
SARS-COV-2 RNA RESP QL NAA+PROBE: NOT DETECTED
SODIUM SERPL-SCNC: 138 MMOL/L (ref 136–145)
SP GR UR REFRACTOMETRY: 1.02 (ref 1–1.03)
UROBILINOGEN UR QL STRIP.AUTO: 0.2 EU/DL (ref 0.2–1)
WBC # BLD AUTO: 13.2 K/UL (ref 4.1–11.1)
WBC URNS QL MICRO: NORMAL /HPF (ref 0–4)

## 2023-11-13 PROCEDURE — 6360000002 HC RX W HCPCS: Performed by: FAMILY MEDICINE

## 2023-11-13 PROCEDURE — 82077 ASSAY SPEC XCP UR&BREATH IA: CPT

## 2023-11-13 PROCEDURE — 85025 COMPLETE CBC W/AUTO DIFF WBC: CPT

## 2023-11-13 PROCEDURE — 99285 EMERGENCY DEPT VISIT HI MDM: CPT

## 2023-11-13 PROCEDURE — 36415 COLL VENOUS BLD VENIPUNCTURE: CPT

## 2023-11-13 PROCEDURE — 80307 DRUG TEST PRSMV CHEM ANLYZR: CPT

## 2023-11-13 PROCEDURE — 80053 COMPREHEN METABOLIC PANEL: CPT

## 2023-11-13 PROCEDURE — 81001 URINALYSIS AUTO W/SCOPE: CPT

## 2023-11-13 PROCEDURE — 96374 THER/PROPH/DIAG INJ IV PUSH: CPT

## 2023-11-13 PROCEDURE — G0378 HOSPITAL OBSERVATION PER HR: HCPCS

## 2023-11-13 PROCEDURE — 80143 DRUG ASSAY ACETAMINOPHEN: CPT

## 2023-11-13 PROCEDURE — 2580000003 HC RX 258: Performed by: FAMILY MEDICINE

## 2023-11-13 PROCEDURE — 80179 DRUG ASSAY SALICYLATE: CPT

## 2023-11-13 PROCEDURE — 96365 THER/PROPH/DIAG IV INF INIT: CPT

## 2023-11-13 PROCEDURE — 74176 CT ABD & PELVIS W/O CONTRAST: CPT

## 2023-11-13 PROCEDURE — 87636 SARSCOV2 & INF A&B AMP PRB: CPT

## 2023-11-13 PROCEDURE — 93005 ELECTROCARDIOGRAM TRACING: CPT | Performed by: FAMILY MEDICINE

## 2023-11-13 PROCEDURE — 96376 TX/PRO/DX INJ SAME DRUG ADON: CPT

## 2023-11-13 PROCEDURE — 96375 TX/PRO/DX INJ NEW DRUG ADDON: CPT

## 2023-11-13 RX ORDER — SODIUM CHLORIDE 9 MG/ML
INJECTION, SOLUTION INTRAVENOUS ONCE
Status: COMPLETED | OUTPATIENT
Start: 2023-11-13 | End: 2023-11-13

## 2023-11-13 RX ORDER — 0.9 % SODIUM CHLORIDE 0.9 %
1000 INTRAVENOUS SOLUTION INTRAVENOUS ONCE
Status: COMPLETED | OUTPATIENT
Start: 2023-11-13 | End: 2023-11-13

## 2023-11-13 RX ORDER — LABETALOL HYDROCHLORIDE 5 MG/ML
20 INJECTION, SOLUTION INTRAVENOUS
Status: COMPLETED | OUTPATIENT
Start: 2023-11-13 | End: 2023-11-13

## 2023-11-13 RX ADMIN — SODIUM CHLORIDE 1000 ML: 9 INJECTION, SOLUTION INTRAVENOUS at 18:10

## 2023-11-13 RX ADMIN — SODIUM CHLORIDE 1000 ML: 9 INJECTION, SOLUTION INTRAVENOUS at 20:38

## 2023-11-13 RX ADMIN — LABETALOL HYDROCHLORIDE 20 MG: 5 INJECTION, SOLUTION INTRAVENOUS at 19:06

## 2023-11-13 RX ADMIN — LABETALOL HYDROCHLORIDE 20 MG: 5 INJECTION, SOLUTION INTRAVENOUS at 18:29

## 2023-11-13 ASSESSMENT — PATIENT HEALTH QUESTIONNAIRE - PHQ9
1. LITTLE INTEREST OR PLEASURE IN DOING THINGS: 0
2. FEELING DOWN, DEPRESSED OR HOPELESS: 0
SUM OF ALL RESPONSES TO PHQ QUESTIONS 1-9: 0
SUM OF ALL RESPONSES TO PHQ9 QUESTIONS 1 & 2: 0
SUM OF ALL RESPONSES TO PHQ QUESTIONS 1-9: 0

## 2023-11-13 ASSESSMENT — PAIN DESCRIPTION - LOCATION
LOCATION: KNEE
LOCATION: BUTTOCKS

## 2023-11-13 ASSESSMENT — PAIN DESCRIPTION - DESCRIPTORS: DESCRIPTORS: ACHING

## 2023-11-13 ASSESSMENT — PAIN SCALES - GENERAL
PAINLEVEL_OUTOF10: 8
PAINLEVEL_OUTOF10: 4

## 2023-11-13 ASSESSMENT — PAIN - FUNCTIONAL ASSESSMENT: PAIN_FUNCTIONAL_ASSESSMENT: 0-10

## 2023-11-13 ASSESSMENT — LIFESTYLE VARIABLES
HOW OFTEN DO YOU HAVE A DRINK CONTAINING ALCOHOL: NEVER
HOW MANY STANDARD DRINKS CONTAINING ALCOHOL DO YOU HAVE ON A TYPICAL DAY: PATIENT DOES NOT DRINK

## 2023-11-13 ASSESSMENT — PAIN DESCRIPTION - ORIENTATION
ORIENTATION: LEFT
ORIENTATION: RIGHT

## 2023-11-13 NOTE — ED NOTES
This writer called [facility] and spoke with Liberty Hammond. Liberty Laceye states they will back when ready to consult with patient.      Jing Villalobos RN  11/13/23 2144

## 2023-11-13 NOTE — ED NOTES
Spoke with Jian at [facility] updated on situation, would like for us to medically clear him before she consults with patient.      Coretta Peraza RN  11/13/23 1588

## 2023-11-13 NOTE — ED TRIAGE NOTES
Pt arrived via EMS with c/o of being evicted from the house he was staying at. States he has not been out of the house or  his bed since the beginning of covid and has not taken any of his prescribed medications since 2018. Pt has been battling a buttock abscess for the past few years. Has not seen a doctor in 5 years also. Pt admits he \"does not want to live anymore\" because he doesn't have a place to stay and has \"had enough\".

## 2023-11-13 NOTE — ED PROVIDER NOTES
Coloration: Skin is not jaundiced. Findings: No bruising. Comments: On the left gluteal region there are two areas of hyperpigmentation and scarring consistent with old abscesses that are nontender. The buttock has no erythema or induration. On the inferior lesion there is an open area approx 4 mm that has no active drainage. Neurological:      General: No focal deficit present. Mental Status: He is alert and oriented to person, place, and time. Motor: No weakness.       Gait: Gait normal.   Psychiatric:         Behavior: Behavior normal.            DIAGNOSTIC RESULTS   LABS:     Recent Results (from the past 24 hour(s))   Urinalysis with Microscopic    Collection Time: 11/13/23  4:42 PM   Result Value Ref Range    Color, UA YELLOW/STRAW      Appearance CLEAR CLEAR      Specific Gravity, UA 1.020 1.003 - 1.030      pH, Urine 6.0 5.0 - 8.0      Protein, UA Negative NEG mg/dL    Glucose, UA Negative NEG mg/dL    Ketones, Urine Negative NEG mg/dL    Bilirubin Urine Negative NEG      Blood, Urine Negative NEG      Urobilinogen, Urine 0.2 0.2 - 1.0 EU/dL    Nitrite, Urine Negative NEG      Leukocyte Esterase, Urine Negative NEG      WBC, UA 0-4 0 - 4 /hpf    RBC, UA 0-5 0 - 5 /hpf    Epithelial Cells UA FEW FEW /lpf    BACTERIA, URINE Negative NEG /hpf   Urine Drug Screen    Collection Time: 11/13/23  4:42 PM   Result Value Ref Range    Amphetamine, Urine Negative NEG      Barbiturates, Urine Negative NEG      Benzodiazepines, Urine Negative NEG      Cocaine, Urine Negative NEG      Methadone, Urine Negative NEG      Opiates, Urine Negative NEG      PCP, Urine Negative NEG      THC, TH-Cannabinol, Urine Positive (A) NEG      Comments: (NOTE)    CBC with Auto Differential    Collection Time: 11/13/23  4:45 PM   Result Value Ref Range    WBC 13.2 (H) 4.1 - 11.1 K/uL    RBC 5.46 4.10 - 5.70 M/uL    Hemoglobin 17.2 (H) 12.1 - 17.0 g/dL    Hematocrit 51.5 (H) 36.6 - 50.3 %    MCV 94.3 80.0 - 99.0 affecting Dx or Tx: Patient lacks support at home or lives alone. Records Reviewed (source and summary of external notes): Nursing Notes, Old Medical Records, Previous Radiology Studies, and Previous Laboratory Studies    CC/HPI Summary, DDx, ED Course, and Reassessment:   CT scan of abd/pelvis/buttock because of tenderness in lower abdomen and chronic problems with the abscesses. Although there is no induration or erythema, there is leukocytosis and evidence on the CT for recurrent cellulitis. He reports that he has not been to a doctor in several years, so has not been treated for this problem. He has a h/o schizophrenia several years ago, but he has not seen a psychiatrist. Will admit for IV antibiotics and have psychiatry see him tomorrow in consultation. ED Course as of 11/13/23 2149 Mon Nov 13, 2023 2133 Pt accepted to [facility] by hospitalist, Dr. Jo Harrison for buttock cellulitis. [VG]      ED Course User Index  [VG] Zachary Parra MD       Disposition Considerations (Tests not done, Shared Decision Making, Pt Expectation of Test or Tx.): 0     FINAL IMPRESSION     1. Cellulitis, gluteal, left          DISPOSITION/PLAN   DISPOSITION Decision To Admit 11/13/2023 09:48:50 PM      Admit Note: Pt is being admitted by Dr. Jazmin Meneses. The results of their tests and reason(s) for their admission have been discussed with pt and/or available family. They convey agreement and understanding for the need to be admitted and for the admission diagnosis. PATIENT REFERRED TO:  No follow-up provider specified. DISCHARGE MEDICATIONS:     Medication List      You have not been prescribed any medications. DISCONTINUED MEDICATIONS:  There are no discharge medications for this patient. I am the Primary Clinician of Record. Zachary Parra MD (electronically signed)      (Please note that parts of this dictation were completed with voice recognition software.  Quite often unanticipated grammatical,

## 2023-11-14 ENCOUNTER — APPOINTMENT (OUTPATIENT)
Facility: HOSPITAL | Age: 48
DRG: 603 | End: 2023-11-14
Payer: MEDICARE

## 2023-11-14 PROBLEM — D64.9 ANEMIA: Status: ACTIVE | Noted: 2018-12-31

## 2023-11-14 PROBLEM — E46 MALNUTRITION (HCC): Status: ACTIVE | Noted: 2018-12-31

## 2023-11-14 PROBLEM — M25.561 CHRONIC PAIN OF RIGHT KNEE: Chronic | Status: ACTIVE | Noted: 2023-11-14

## 2023-11-14 PROBLEM — G89.29 CHRONIC PAIN OF RIGHT KNEE: Chronic | Status: ACTIVE | Noted: 2023-11-14

## 2023-11-14 PROBLEM — F10.10 ALCOHOL ABUSE: Status: ACTIVE | Noted: 2023-11-14

## 2023-11-14 PROBLEM — F32.A DEPRESSION: Status: ACTIVE | Noted: 2023-11-14

## 2023-11-14 PROBLEM — F20.9 SCHIZOPHRENIA (HCC): Status: ACTIVE | Noted: 2018-11-21

## 2023-11-14 PROBLEM — E55.9 VITAMIN D DEFICIENCY: Status: ACTIVE | Noted: 2019-01-01

## 2023-11-14 PROBLEM — F12.10 CANNABIS ABUSE: Status: ACTIVE | Noted: 2018-12-31

## 2023-11-14 PROBLEM — D50.9 IRON DEFICIENCY ANEMIA: Status: ACTIVE | Noted: 2019-01-01

## 2023-11-14 PROBLEM — I10 PRIMARY HYPERTENSION: Status: ACTIVE | Noted: 2023-11-14

## 2023-11-14 PROBLEM — L02.91 ABSCESS: Status: ACTIVE | Noted: 2018-12-31

## 2023-11-14 PROBLEM — F17.200 TOBACCO DEPENDENCE: Status: ACTIVE | Noted: 2023-11-14

## 2023-11-14 PROBLEM — S31.809A WOUND OF BUTTOCK: Status: ACTIVE | Noted: 2018-12-27

## 2023-11-14 LAB
ANION GAP SERPL CALC-SCNC: 10 MMOL/L (ref 5–15)
BASOPHILS # BLD: 0.1 K/UL (ref 0–0.1)
BASOPHILS NFR BLD: 1 % (ref 0–1)
BUN SERPL-MCNC: 10 MG/DL (ref 6–20)
BUN/CREAT SERPL: 12 (ref 12–20)
CALCIUM SERPL-MCNC: 8.5 MG/DL (ref 8.5–10.1)
CHLORIDE SERPL-SCNC: 105 MMOL/L (ref 97–108)
CO2 SERPL-SCNC: 26 MMOL/L (ref 21–32)
CREAT SERPL-MCNC: 0.83 MG/DL (ref 0.7–1.3)
DIFFERENTIAL METHOD BLD: ABNORMAL
EOSINOPHIL # BLD: 0.3 K/UL (ref 0–0.4)
EOSINOPHIL NFR BLD: 2 % (ref 0–7)
ERYTHROCYTE [DISTWIDTH] IN BLOOD BY AUTOMATED COUNT: 13.1 % (ref 11.5–14.5)
GLUCOSE SERPL-MCNC: 104 MG/DL (ref 65–100)
HCT VFR BLD AUTO: 46.6 % (ref 36.6–50.3)
HGB BLD-MCNC: 15.3 G/DL (ref 12.1–17)
IMM GRANULOCYTES # BLD AUTO: 0.1 K/UL (ref 0–0.04)
IMM GRANULOCYTES NFR BLD AUTO: 1 % (ref 0–0.5)
LYMPHOCYTES # BLD: 2.9 K/UL (ref 0.8–3.5)
LYMPHOCYTES NFR BLD: 21 % (ref 12–49)
MCH RBC QN AUTO: 31.6 PG (ref 26–34)
MCHC RBC AUTO-ENTMCNC: 32.8 G/DL (ref 30–36.5)
MCV RBC AUTO: 96.3 FL (ref 80–99)
MONOCYTES # BLD: 1.1 K/UL (ref 0–1)
MONOCYTES NFR BLD: 8 % (ref 5–13)
NEUTS SEG # BLD: 9.2 K/UL (ref 1.8–8)
NEUTS SEG NFR BLD: 67 % (ref 32–75)
NRBC # BLD: 0 K/UL (ref 0–0.01)
NRBC BLD-RTO: 0 PER 100 WBC
PLATELET # BLD AUTO: 271 K/UL (ref 150–400)
PMV BLD AUTO: 9.7 FL (ref 8.9–12.9)
POTASSIUM SERPL-SCNC: 3.8 MMOL/L (ref 3.5–5.1)
RBC # BLD AUTO: 4.84 M/UL (ref 4.1–5.7)
SODIUM SERPL-SCNC: 141 MMOL/L (ref 136–145)
WBC # BLD AUTO: 13.7 K/UL (ref 4.1–11.1)

## 2023-11-14 PROCEDURE — 73564 X-RAY EXAM KNEE 4 OR MORE: CPT

## 2023-11-14 PROCEDURE — 96376 TX/PRO/DX INJ SAME DRUG ADON: CPT

## 2023-11-14 PROCEDURE — 97162 PT EVAL MOD COMPLEX 30 MIN: CPT

## 2023-11-14 PROCEDURE — 85025 COMPLETE CBC W/AUTO DIFF WBC: CPT

## 2023-11-14 PROCEDURE — 96372 THER/PROPH/DIAG INJ SC/IM: CPT

## 2023-11-14 PROCEDURE — 2500000003 HC RX 250 WO HCPCS: Performed by: STUDENT IN AN ORGANIZED HEALTH CARE EDUCATION/TRAINING PROGRAM

## 2023-11-14 PROCEDURE — 71045 X-RAY EXAM CHEST 1 VIEW: CPT

## 2023-11-14 PROCEDURE — 96375 TX/PRO/DX INJ NEW DRUG ADDON: CPT

## 2023-11-14 PROCEDURE — 36415 COLL VENOUS BLD VENIPUNCTURE: CPT

## 2023-11-14 PROCEDURE — 2580000003 HC RX 258: Performed by: STUDENT IN AN ORGANIZED HEALTH CARE EDUCATION/TRAINING PROGRAM

## 2023-11-14 PROCEDURE — 6370000000 HC RX 637 (ALT 250 FOR IP): Performed by: STUDENT IN AN ORGANIZED HEALTH CARE EDUCATION/TRAINING PROGRAM

## 2023-11-14 PROCEDURE — 6360000002 HC RX W HCPCS: Performed by: STUDENT IN AN ORGANIZED HEALTH CARE EDUCATION/TRAINING PROGRAM

## 2023-11-14 PROCEDURE — 80048 BASIC METABOLIC PNL TOTAL CA: CPT

## 2023-11-14 PROCEDURE — 97110 THERAPEUTIC EXERCISES: CPT

## 2023-11-14 PROCEDURE — 6370000000 HC RX 637 (ALT 250 FOR IP): Performed by: INTERNAL MEDICINE

## 2023-11-14 PROCEDURE — 1100000000 HC RM PRIVATE

## 2023-11-14 RX ORDER — METOPROLOL TARTRATE 1 MG/ML
5 INJECTION, SOLUTION INTRAVENOUS ONCE
Status: COMPLETED | OUTPATIENT
Start: 2023-11-14 | End: 2023-11-14

## 2023-11-14 RX ORDER — POTASSIUM CHLORIDE 750 MG/1
40 TABLET, FILM COATED, EXTENDED RELEASE ORAL PRN
Status: DISCONTINUED | OUTPATIENT
Start: 2023-11-14 | End: 2023-11-15

## 2023-11-14 RX ORDER — AMLODIPINE BESYLATE 10 MG/1
10 TABLET ORAL DAILY
Status: DISCONTINUED | OUTPATIENT
Start: 2023-11-14 | End: 2023-11-17 | Stop reason: HOSPADM

## 2023-11-14 RX ORDER — LORAZEPAM 1 MG/1
1 TABLET ORAL ONCE
Status: COMPLETED | OUTPATIENT
Start: 2023-11-14 | End: 2023-11-14

## 2023-11-14 RX ORDER — ONDANSETRON 2 MG/ML
4 INJECTION INTRAMUSCULAR; INTRAVENOUS EVERY 6 HOURS PRN
Status: DISCONTINUED | OUTPATIENT
Start: 2023-11-14 | End: 2023-11-15

## 2023-11-14 RX ORDER — ACETAMINOPHEN 650 MG/1
650 SUPPOSITORY RECTAL EVERY 6 HOURS PRN
Status: DISCONTINUED | OUTPATIENT
Start: 2023-11-14 | End: 2023-11-17 | Stop reason: HOSPADM

## 2023-11-14 RX ORDER — ENOXAPARIN SODIUM 100 MG/ML
40 INJECTION SUBCUTANEOUS DAILY
Status: DISCONTINUED | OUTPATIENT
Start: 2023-11-14 | End: 2023-11-17 | Stop reason: HOSPADM

## 2023-11-14 RX ORDER — SODIUM CHLORIDE 0.9 % (FLUSH) 0.9 %
5-40 SYRINGE (ML) INJECTION EVERY 12 HOURS SCHEDULED
Status: DISCONTINUED | OUTPATIENT
Start: 2023-11-14 | End: 2023-11-15

## 2023-11-14 RX ORDER — FAMOTIDINE 20 MG/1
20 TABLET, FILM COATED ORAL NIGHTLY
Status: DISCONTINUED | OUTPATIENT
Start: 2023-11-14 | End: 2023-11-17 | Stop reason: HOSPADM

## 2023-11-14 RX ORDER — SODIUM CHLORIDE 9 MG/ML
INJECTION, SOLUTION INTRAVENOUS PRN
Status: DISCONTINUED | OUTPATIENT
Start: 2023-11-14 | End: 2023-11-15

## 2023-11-14 RX ORDER — LORAZEPAM 2 MG/ML
2 INJECTION INTRAMUSCULAR ONCE
Status: COMPLETED | OUTPATIENT
Start: 2023-11-14 | End: 2023-11-14

## 2023-11-14 RX ORDER — LORAZEPAM 0.5 MG/1
0.5 TABLET ORAL EVERY 8 HOURS SCHEDULED
Status: DISCONTINUED | OUTPATIENT
Start: 2023-11-14 | End: 2023-11-15

## 2023-11-14 RX ORDER — MAGNESIUM SULFATE IN WATER 40 MG/ML
2000 INJECTION, SOLUTION INTRAVENOUS PRN
Status: DISCONTINUED | OUTPATIENT
Start: 2023-11-14 | End: 2023-11-15

## 2023-11-14 RX ORDER — SODIUM CHLORIDE 0.9 % (FLUSH) 0.9 %
5-40 SYRINGE (ML) INJECTION PRN
Status: DISCONTINUED | OUTPATIENT
Start: 2023-11-14 | End: 2023-11-17 | Stop reason: HOSPADM

## 2023-11-14 RX ORDER — TRAZODONE HYDROCHLORIDE 100 MG/1
100 TABLET ORAL NIGHTLY
Status: DISCONTINUED | OUTPATIENT
Start: 2023-11-14 | End: 2023-11-17 | Stop reason: HOSPADM

## 2023-11-14 RX ORDER — TRAZODONE HYDROCHLORIDE 100 MG/1
100 TABLET ORAL NIGHTLY
Status: DISCONTINUED | OUTPATIENT
Start: 2023-11-14 | End: 2023-11-14

## 2023-11-14 RX ORDER — POLYETHYLENE GLYCOL 3350 17 G/17G
17 POWDER, FOR SOLUTION ORAL DAILY PRN
Status: DISCONTINUED | OUTPATIENT
Start: 2023-11-14 | End: 2023-11-17 | Stop reason: HOSPADM

## 2023-11-14 RX ORDER — ACETAMINOPHEN 325 MG/1
650 TABLET ORAL EVERY 6 HOURS PRN
Status: DISCONTINUED | OUTPATIENT
Start: 2023-11-14 | End: 2023-11-17 | Stop reason: HOSPADM

## 2023-11-14 RX ORDER — ONDANSETRON 4 MG/1
4 TABLET, ORALLY DISINTEGRATING ORAL EVERY 8 HOURS PRN
Status: DISCONTINUED | OUTPATIENT
Start: 2023-11-14 | End: 2023-11-17 | Stop reason: HOSPADM

## 2023-11-14 RX ORDER — MELOXICAM 7.5 MG/1
15 TABLET ORAL
Status: DISCONTINUED | OUTPATIENT
Start: 2023-11-14 | End: 2023-11-17

## 2023-11-14 RX ORDER — POTASSIUM CHLORIDE 7.45 MG/ML
10 INJECTION INTRAVENOUS PRN
Status: DISCONTINUED | OUTPATIENT
Start: 2023-11-14 | End: 2023-11-15

## 2023-11-14 RX ADMIN — CEFTRIAXONE SODIUM 1000 MG: 1 INJECTION, POWDER, FOR SOLUTION INTRAMUSCULAR; INTRAVENOUS at 03:22

## 2023-11-14 RX ADMIN — LORAZEPAM 0.5 MG: 0.5 TABLET ORAL at 21:30

## 2023-11-14 RX ADMIN — AMLODIPINE BESYLATE 10 MG: 10 TABLET ORAL at 09:04

## 2023-11-14 RX ADMIN — MELOXICAM 15 MG: 7.5 TABLET ORAL at 17:27

## 2023-11-14 RX ADMIN — ACETAMINOPHEN 650 MG: 325 TABLET ORAL at 16:25

## 2023-11-14 RX ADMIN — LORAZEPAM 2 MG: 2 INJECTION INTRAMUSCULAR; INTRAVENOUS at 03:21

## 2023-11-14 RX ADMIN — ENOXAPARIN SODIUM 40 MG: 100 INJECTION SUBCUTANEOUS at 09:06

## 2023-11-14 RX ADMIN — LORAZEPAM 1 MG: 1 TABLET ORAL at 17:28

## 2023-11-14 RX ADMIN — METOPROLOL TARTRATE 5 MG: 5 INJECTION INTRAVENOUS at 03:22

## 2023-11-14 RX ADMIN — SODIUM CHLORIDE, PRESERVATIVE FREE 10 ML: 5 INJECTION INTRAVENOUS at 21:30

## 2023-11-14 RX ADMIN — FAMOTIDINE 20 MG: 20 TABLET ORAL at 21:30

## 2023-11-14 RX ADMIN — TRAZODONE HYDROCHLORIDE 100 MG: 100 TABLET ORAL at 03:47

## 2023-11-14 ASSESSMENT — PAIN SCALES - GENERAL
PAINLEVEL_OUTOF10: 5
PAINLEVEL_OUTOF10: 9
PAINLEVEL_OUTOF10: 4

## 2023-11-14 ASSESSMENT — PAIN DESCRIPTION - LOCATION
LOCATION: KNEE
LOCATION: KNEE

## 2023-11-14 ASSESSMENT — PAIN DESCRIPTION - ORIENTATION
ORIENTATION: RIGHT
ORIENTATION: RIGHT

## 2023-11-14 ASSESSMENT — PAIN DESCRIPTION - DESCRIPTORS
DESCRIPTORS: ACHING
DESCRIPTORS: ACHING

## 2023-11-14 ASSESSMENT — PAIN - FUNCTIONAL ASSESSMENT
PAIN_FUNCTIONAL_ASSESSMENT: PREVENTS OR INTERFERES WITH ALL ACTIVE AND SOME PASSIVE ACTIVITIES
PAIN_FUNCTIONAL_ASSESSMENT: INTOLERABLE, UNABLE TO DO ANY ACTIVE OR PASSIVE ACTIVITIES

## 2023-11-14 NOTE — ED PROVIDER NOTES
Reassessed patient this morning. He states he is not currently feeling actively suicidal and does not have any intent on hurting himself at this time.     MD Ankur Gaona MD  11/14/23 2936

## 2023-11-14 NOTE — ED NOTES
Pt incontinent of large amount of urine ans brown stool. Josephine care performed. Wound care provided to left buttocks  - area with scant amount of bleeding and foul odor  -  cleansed with wound cleanser, patted dry, then applied an abd pad and secured with Medipore. Pt tolerated without incidence. Encouraged pt to stay off of left buttocks as much as possible -  pillows offered. Warm blankets provided. SR- on monitor.      Jj Hartmann RN  11/14/23 6692

## 2023-11-14 NOTE — PROGRESS NOTES
-Spoke with Miguel Savage in [facility], Dr. Trevon Navas can complete the consult tomorrow.

## 2023-11-14 NOTE — CARE COORDINATION
Care Management Initial Assessment       RUR:n/a In obs   Readmission? No  1st IM letter given? Yes -   letter given: No       23 1546   Service Assessment   Patient Orientation Alert and Oriented   Cognition Alert   History Provided By Patient   Primary Caregiver Friend  (Girlfriend)   Support Systems None   Patient's Healthcare Decision Maker is: Legal Next of Kin  (PATIENT STATES HE HAS ABSOLUTELY NO ONE)   PCP Verified by CM Yes  (NONE)   Last Visit to PCP   (HASN'T SEEN A PCP IN OVER 5 YEARS)   Prior Functional Level Assistance with the following:;Bathing;Dressing; Toileting;Cooking;Housework; Shopping;Mobility   Current Functional Level Assistance with the following:;Bathing;Dressing; Toileting;Cooking;Housework; Shopping;Mobility   Can patient return to prior living arrangement No   Ability to make needs known: Good   Family able to assist with home care needs: No   Would you like for me to discuss the discharge plan with any other family members/significant others, and if so, who? No   Financial Resources Medicaid; Medicare   Social/Functional History   Lives With Other (comment)  (NOW HOMELESS)   Type of [id number] None   Active  No   Patient's  Info NO ONE   Discharge Planning   Type of Residence Long-Term Care;Homeless   Current Services Prior To Admission None   Patient expects to be discharged to: Unknown       Mr. Fink was living with his girlfriend Marilin De Leon. According to patient she \"got tired of taking care of me\". He is now homeless. States he has absolutely no one that he can go to or talk to. States his mother  and  he doesn't speak with his father since he started a new family. Does NOT have any friends. He was relying on his girlfriend and her friend to care for him. Patient states he is unable to get up and walk. Can NOT manage MOST of his self care needs. States he has been on disability for 20 years.  He hasn't gotten out of bed for 5 years. Does NOT have any DME. He thinks he may be able to do more for himself if he had a wheelchair. Has NOT seen a PCP in over 5 years. Patient state he is very depressed and wishes he could be euthanized. Primary nurse made aware of this statement. Told him psychologist will see him tomorrow; offered emotional support. Told him possibility could be for him to go to a LTC facility to help him with ADLS/IADLS and wound care. CM will complete UAI. Mr. Kwadwo Teresa was in observation status. Medicare Outpatient Observation Notice provided to [facility] Oral explanation was provided and all questions answered. Signed document placed on the bedside chart to be scanned under the media tab. Copy provided to [facility]. He is currently in inpatient status. Important Message from Medicare Letter provided to Brain Debeau. Oral explanation was provided and all questions answered. Signed document placed on the bedside chart to be scanned under the media tab. Copy provided to [facility]. Mr. Freeman Thomas provided CM info and told to contact us if he has any questions or concerns during his stay.

## 2023-11-14 NOTE — PLAN OF CARE
Problem: Physical Therapy - Adult  Goal: By Discharge: Performs mobility at highest level of function for planned discharge setting. See evaluation for individualized goals. Description: FUNCTIONAL STATUS PRIOR TO ADMISSION: Pt was living w/ his ex-GF who was his CG but now he has been evicted and has a restraining order in place so he is not even allowed to contact her; he will not be going back to her home. He is now homeless w/ dx of schizophrenia. Apparently pt has been living on her couch for 2-3 years. He does not get up, does not normally get dressed nor wear shoes, he does not transfer, he does not stand and per his report he does not even sit upright on the couch. Pt states that he has not even attempted to stand for at least 2.5 years. He \"washes up\" w/ body wipes, has bowel movements in his adult briefs which ex-GF/CG was helping him change and addressing his pericare, he urinates in a jug and is essentially dependent on her for all IADLs. He did have a small refrigerator next to the couch that he kept some food in. He noted that he has not left the house since Covid-19. He expressed that he drinks alcohol, smokes cigarettes and takes cannabis gummies to help w/ his pain from R knee and L gluteal abscesses. HOME SUPPORT PRIOR TO ADMISSION: as noted above    Physical Therapy Goals  Initiated 11/14/2023  1. Patient will move from supine to sit and sit to supine in bed with modified independence within 7 day(s). 2.  Patient will tolerate sitting on EOB x10 minutes with SBA to prepare for additional out of bed activities within 7 day(s). 3.  Patient will transfer from bed to chair and chair to bed with moderate assistance using the least restrictive device or slide board within 7 day(s).     Outcome: Progressing   PHYSICAL THERAPY EVALUATION    Patient: Tram Choe (35 y.o. male)  Date: 11/14/2023  Primary Diagnosis: Cellulitis [L03.90]  Cellulitis, gluteal, left [L03.317]       Precautions:

## 2023-11-14 NOTE — ED NOTES
Bedside report to Walter Adler RN Charge and to Bev Steiner RN using SBAR format.      Saqib Diehl RN  11/14/23 8419

## 2023-11-14 NOTE — PROGRESS NOTES
[facility]  Hospitalist Progress Note    NAME: Arley Westfall   :  1975   MRN:  [id number]     Total duration of encounter: 1 day      Interim Hospital Summary: 51 y.o. male who presented on 2023 with Cellulitis. He has no past medical history on file. .     Presented due to being homeless. C/o chronic buttocks pain. Rx Rocephin for chronic cellulitis. Has been living on friends couch now for ?? 3 yrs. Does not get up due to R knee pain. Subjective:     Chief Complaint / Reason for Physician Visit  \"Knee pain\". Discussed with RN   Lives on a cough until lost his welcome, now homeless ? ? Has c/o being depressed and SI. Chart mentions Schizophrenia  Taking Ibuprofen OTC 800mg tid with THC gummies (Delta)  Drinks daily. Denies h/o withdrawals or DTS    Prior tx at [facility] in [facility]  Has h/o multiple surgeries on buttocks / perineum for ?  Fistulas / Abscess    Review of Systems:  Symptom Y/N Comments  Symptom Y/N Comments   Fever/Chills y   Chest Pain n    Poor Appetite n   Edema n    Cough n   Abdominal Pain y    Sputum n   Joint Pain y R knee   SOB/PARDO n   Pruritis/Rash y Buttocks   Nausea/vomit n   Tolerating PT/OT     Diarrhea n   Tolerating Diet y    Constipation n   Other         Current Facility-Administered Medications:     traZODone (DESYREL) tablet 100 mg, 100 mg, Oral, Nightly, Koby Riley, DO, 100 mg at 23 0347    cefTRIAXone (ROCEPHIN) 1,000 mg in sodium chloride 0.9 % 50 mL IVPB (mini-bag), 1,000 mg, IntraVENous, Q24H, Koby Riley, DO, Stopped at 23 0352    sodium chloride flush 0.9 % injection 5-40 mL, 5-40 mL, IntraVENous, 2 times per day, Koby, Riley, DO    sodium chloride flush 0.9 % injection 5-40 mL, 5-40 mL, IntraVENous, PRN, Koby, Riley, DO    0.9 % sodium chloride infusion, , IntraVENous, PRN, Koby, Riley, DO    potassium chloride (KLOR-CON) extended release tablet 40 mEq, 40 mEq, Oral, PRN **OR** potassium

## 2023-11-14 NOTE — ED NOTES
Admission SBAR Note  Situation/Background:     Patient is being transferred to [facility]    Patient's Chief Complaint was Mental health problem, wound infection and is admitted for cellulitis, gluteal, left. CODE STATUS: Full  CSSRS: No     ISOLATION/PRECAUTIONS: No  ISOLATION TYPE: N/A    Is this a behavioral health patient? No  Has wanding been completed No  Are belongings secure? N/A    Called outstanding consults: Yes    STAT labs collected: Yes    Repeat Lactic Acid DUE? No  TIME DUE: N/A    All STAT orders are complete: Yes    The following personal items will be sent with the patient during transfer to the floor: All valuables: listed in ER with patient, with patient at bedside       ASSESSMENT:    NEURO:   NIH SCORE:  N/A   CHARISSA SWALLOW SCREEN COMPLETE: No  ORIENTATION LEVEL: ORIENTATION LEVEL: Person, Place, Time, and Situation  Cognition:  appropriate decision making, appropriate for age attention/concentration, appropriate safety awareness, and following commands  follows multi-step complex commands/direction  Speech: shows no evidence of impairment    Is patient impulsive? No  Is patient oriented? Yes  Do they follow commands? Yes  Is the patient ambulatory? No    FALL RISK? Yes  Interventions: Implemented/recommended use of non-skid footwear, Implemented/recommended use of fall risk identification flag to all team members, Implemented/recommended environmental changes (remove hazards, lower bed, improve lighting, etc.), and Implemented/recommended increased supervision/assistance    RESPIRATORY:   Is patient on oxygen? No  Oxygen therapy: room air  O2 rate: N/A    CARDIAC:   Is cardiac monitoring ordered? No    Last Rhythm: Rhythm including paccardio: Sinus Tachycardia 111  Patient to transfer with tele box on?  No  Infusions: Meds; iv fluids: none  LINE ACCESS: 20G Peripheral IV , Antecubital and Right ,        /GI:   Continent

## 2023-11-14 NOTE — ASSESSMENT & PLAN NOTE
Started \"has no reason to live. \"   Lost son due to covid  Evia Peers bound and now homeless  Consult psychiatry

## 2023-11-14 NOTE — ACP (ADVANCE CARE PLANNING)
Advance Care Planning     General Advance Care Planning (ACP) Conversation    Date of Conversation: 11/13/2023  Conducted with: Patient with Decision Making Capacity    Healthcare Decision Maker:  No healthcare decision makers have been documented. Click here to complete [address] including selection of the Healthcare Decision Maker Relationship (ie \"Primary\")   Today we discussed [address] The patient is considering options.     Content/Action Overview:  DECLINED ACP Conversation - will revisit periodically  Reviewed DNR/DNI and patient elects Full Code (Attempt Resuscitation)  treatment goals       Length of Voluntary ACP Conversation in minutes:  <16 minutes (Non-Billable)    Layo Olguin

## 2023-11-14 NOTE — ED NOTES
Called IP unit to inform that etransfer note is entered and pt is ready to move to unit.       Josefina Valdes RN  11/14/23 7095

## 2023-11-14 NOTE — PLAN OF CARE
Med/surg nurse to page when patient has arrived to unit and ready for teledoc visit. Thank you.     Electronically signed by Richi Walker DO on 11/13/2023 at 9:29 PM

## 2023-11-14 NOTE — H&P
V2.0  History and Physical      Name:  Supriya Perez /Age/Sex: 1975  (51 y.o. male)   MRN & CSN:  [id number] & [id number] Encounter Date/Time: 23   Location:  ED03/03 PCP: No primary care provider on file. Hospital Day: 2    Assessment and Plan:   Supriya Perez is a 51 y.o. male with a pmh as below who presents with Cellulitis    Hospital Problems             Last Modified POA    * (Principal) Cellulitis 2023 Yes    Schizophrenia ([address]) 2023 Yes    Cannabis abuse 2023 Yes    Primary hypertension 2023 Yes    Depression 2023 Yes       Plan:  Cellulitis  Left gluteal and intragluteal cellulitis  CT: Left gluteal cellulitis and intragluteal cellulitis. Hx of multiple gluteal/thigh absesses  Mostly couch bound  Start rocephin    Schizophrenia ([address])  Has not been on any medications for > 5 years  Likely contributing to couch bound status    Primary hypertension  Not on medication  Start amlodipine 10 mg      Cannabis abuse  Recommend cessation    Depression  Started \"has no reason to live. \"   Lost son due to covid  Santo Lancing bound and now homeless  Consult psychiatry    Disposition:   Current Living situation: Homeless  Expected Disposition: ?  Estimated D/C: 2    Diet ADULT DIET;   DVT Prophylaxis [x] Lovenox, []  Heparin, [] SCDs, [] Ambulation,  [] Eliquis, [] Xarelto, [] Coumadin   Code Status Full Code     Personally reviewed Lab Studies and Imaging     History from:     patient    History of Present Illness:     Chief Complaint: Daniel Castaneda is a 51 y.o. male with pmh as below who presents to the ED after being evicted from his girlfriends house. He states he has been having a rough time lately. He states he \"has no reason to live as I live on the couch, mostly bedbound and my son  from covid. \" He denies any suicidal or homicidal ideations. He states he is mostly couch bound because his knee gave out and is unsure of why.  He states he has not Pulmonary:      Effort: Pulmonary effort is normal.      Breath sounds: No wheezing, rhonchi or rales. Abdominal:      General: There is no distension. Palpations: Abdomen is soft. Tenderness: There is no abdominal tenderness. There is no guarding. Musculoskeletal:         General: Normal range of motion. Cervical back: Neck supple. Skin:     General: Skin is warm and dry. Capillary Refill: Capillary refill takes less than 2 seconds. Neurological:      General: No focal deficit present. Mental Status: He is alert and oriented to person, place, and time. Mental status is at baseline. Cranial Nerves: No cranial nerve deficit. Sensory: No sensory deficit. Psychiatric:         Mood and Affect: Mood normal.         Behavior: Behavior normal.         Past Medical History:   PMHx History reviewed. No pertinent past medical history. PSHX:  has a past surgical history that includes XR MIDLINE EQUAL OR GREATER THAN 5 YEARS (11/20/2018). Allergies: Allergies   Allergen Reactions    Penicillins Nausea And Vomiting     Other reaction(s): gi distress  doesn't remember the drug but gave him projectile vomiting, it was given orally, pill bottle said PCN derivative on it per patient      Shellfish Allergy Shortness Of Breath and Swelling     Other reaction(s): wheezing/sob     Fam HX: family history is not on file.   Soc HX:   Social History     Socioeconomic History    Marital status: Single     Spouse name: None    Number of children: None    Years of education: None    Highest education level: None       Medications:   Medications:    traZODone  100 mg Oral Nightly    cefTRIAXone (ROCEPHIN) IV  1,000 mg IntraVENous Q24H    sodium chloride flush  5-40 mL IntraVENous 2 times per day    enoxaparin  40 mg SubCUTAneous Daily    amLODIPine  10 mg Oral Daily      Infusions:   PRN Meds: sodium chloride flush, 5-40 mL, PRN  sodium chloride, , PRN  potassium chloride, 40 mEq, PRN

## 2023-11-14 NOTE — ED NOTES
Bedside care handoff given to charge nurse Sera WEBSTER in [employer].      Kathy Hancock, RN  11/13/23 1914

## 2023-11-14 NOTE — PROCEDURES
Accessed patient's chart to document c-ssr suicide screening. Patient stated to this RN that he currently had thoughts of hopelessness stating he wished his life would end. When asked if he had a plan, patient states he \"thought about a gun\".

## 2023-11-14 NOTE — ASSESSMENT & PLAN NOTE
Left gluteal and intragluteal cellulitis  CT: Left gluteal cellulitis and intragluteal cellulitis.   Hx of multiple gluteal/thigh absesses  Mostly couch bound  Start rocephin

## 2023-11-14 NOTE — PROGRESS NOTES
OTR reviewed ED order at 1902 and notes patient is awaiting bed on the floor at 11:34. OTR will need to assess self care capabilities which is best performed in a regular room. OTR not available later this afternoon. Will evaluate tomorrow if patient is admitted to unit.     Yasmin Faith MS, OTR/L

## 2023-11-15 ENCOUNTER — APPOINTMENT (OUTPATIENT)
Facility: HOSPITAL | Age: 48
DRG: 603 | End: 2023-11-15
Payer: MEDICARE

## 2023-11-15 LAB
EKG ATRIAL RATE: 121 BPM
EKG DIAGNOSIS: NORMAL
EKG P AXIS: 50 DEGREES
EKG P-R INTERVAL: 146 MS
EKG Q-T INTERVAL: 310 MS
EKG QRS DURATION: 78 MS
EKG QTC CALCULATION (BAZETT): 440 MS
EKG R AXIS: 13 DEGREES
EKG T AXIS: 32 DEGREES
EKG VENTRICULAR RATE: 121 BPM

## 2023-11-15 PROCEDURE — 97166 OT EVAL MOD COMPLEX 45 MIN: CPT

## 2023-11-15 PROCEDURE — 2580000003 HC RX 258: Performed by: STUDENT IN AN ORGANIZED HEALTH CARE EDUCATION/TRAINING PROGRAM

## 2023-11-15 PROCEDURE — 6360000002 HC RX W HCPCS: Performed by: STUDENT IN AN ORGANIZED HEALTH CARE EDUCATION/TRAINING PROGRAM

## 2023-11-15 PROCEDURE — 6370000000 HC RX 637 (ALT 250 FOR IP): Performed by: STUDENT IN AN ORGANIZED HEALTH CARE EDUCATION/TRAINING PROGRAM

## 2023-11-15 PROCEDURE — 97110 THERAPEUTIC EXERCISES: CPT

## 2023-11-15 PROCEDURE — 94760 N-INVAS EAR/PLS OXIMETRY 1: CPT

## 2023-11-15 PROCEDURE — 99222 1ST HOSP IP/OBS MODERATE 55: CPT | Performed by: PSYCHIATRY & NEUROLOGY

## 2023-11-15 PROCEDURE — 6370000000 HC RX 637 (ALT 250 FOR IP): Performed by: INTERNAL MEDICINE

## 2023-11-15 PROCEDURE — 73721 MRI JNT OF LWR EXTRE W/O DYE: CPT

## 2023-11-15 PROCEDURE — 1100000000 HC RM PRIVATE

## 2023-11-15 RX ORDER — LORAZEPAM 0.5 MG/1
0.5 TABLET ORAL 3 TIMES DAILY PRN
Status: DISCONTINUED | OUTPATIENT
Start: 2023-11-15 | End: 2023-11-17 | Stop reason: HOSPADM

## 2023-11-15 RX ORDER — CEPHALEXIN 250 MG/1
500 CAPSULE ORAL EVERY 8 HOURS SCHEDULED
Status: DISCONTINUED | OUTPATIENT
Start: 2023-11-15 | End: 2023-11-17 | Stop reason: HOSPADM

## 2023-11-15 RX ORDER — METOPROLOL TARTRATE 50 MG/1
50 TABLET, FILM COATED ORAL 2 TIMES DAILY
Status: DISCONTINUED | OUTPATIENT
Start: 2023-11-16 | End: 2023-11-17 | Stop reason: HOSPADM

## 2023-11-15 RX ADMIN — METOPROLOL TARTRATE 25 MG: 25 TABLET, FILM COATED ORAL at 20:07

## 2023-11-15 RX ADMIN — ENOXAPARIN SODIUM 40 MG: 100 INJECTION SUBCUTANEOUS at 09:02

## 2023-11-15 RX ADMIN — CEPHALEXIN 500 MG: 250 CAPSULE ORAL at 22:32

## 2023-11-15 RX ADMIN — LORAZEPAM 0.5 MG: 0.5 TABLET ORAL at 21:33

## 2023-11-15 RX ADMIN — METOPROLOL TARTRATE 25 MG: 25 TABLET, FILM COATED ORAL at 09:02

## 2023-11-15 RX ADMIN — LORAZEPAM 0.5 MG: 0.5 TABLET ORAL at 06:27

## 2023-11-15 RX ADMIN — TRAZODONE HYDROCHLORIDE 100 MG: 100 TABLET ORAL at 20:07

## 2023-11-15 RX ADMIN — SODIUM CHLORIDE, PRESERVATIVE FREE 10 ML: 5 INJECTION INTRAVENOUS at 09:02

## 2023-11-15 RX ADMIN — MELOXICAM 15 MG: 7.5 TABLET ORAL at 17:12

## 2023-11-15 RX ADMIN — AMLODIPINE BESYLATE 10 MG: 10 TABLET ORAL at 09:02

## 2023-11-15 RX ADMIN — CEFTRIAXONE SODIUM 1000 MG: 1 INJECTION, POWDER, FOR SOLUTION INTRAMUSCULAR; INTRAVENOUS at 02:23

## 2023-11-15 RX ADMIN — ACETAMINOPHEN 650 MG: 325 TABLET ORAL at 09:02

## 2023-11-15 RX ADMIN — FAMOTIDINE 20 MG: 20 TABLET ORAL at 20:07

## 2023-11-15 RX ADMIN — Medication 10 ML: at 22:32

## 2023-11-15 RX ADMIN — LORAZEPAM 0.5 MG: 0.5 TABLET ORAL at 13:59

## 2023-11-15 ASSESSMENT — PAIN SCALES - GENERAL
PAINLEVEL_OUTOF10: 7
PAINLEVEL_OUTOF10: 4

## 2023-11-15 ASSESSMENT — PAIN DESCRIPTION - LOCATION
LOCATION: BACK
LOCATION: KNEE

## 2023-11-15 ASSESSMENT — PAIN DESCRIPTION - FREQUENCY: FREQUENCY: INTERMITTENT

## 2023-11-15 ASSESSMENT — PAIN DESCRIPTION - ORIENTATION: ORIENTATION: RIGHT

## 2023-11-15 NOTE — PROGRESS NOTES
[facility]  Hospitalist Progress Note    NAME: Chapin Pagan   :  1975   MRN:  [id number]     Total duration of encounter: 2 days      Interim Hospital Summary: 51 y.o. male who presented on 2023 with Cellulitis. He has no past medical history on file. .     Presented due to being homeless. C/o chronic buttocks pain. Rx Rocephin for chronic cellulitis. Has been living on friends couch now for ?? 3 yrs. Does not get up due to R knee pain. Pt seen by Psychiatry and Orthopedics today. Pt needs assistance with placement and rehabilitation - consider SNR placement. Subjective:     Chief Complaint / Reason for Physician Visit  \"better\". Discussed with RN   Participated better with therapy today  Had c/o being depressed and Ángel Robledo has made his assessment below  Dr. Omid George advises rehab for the R knee    Taking Ibuprofen OTC 800mg tid with THC gummies (Delta ?)  Drinks hard liquor daily. Denies h/o withdrawals or DTS    Prior tx at [facility] in [facility]  Has h/o multiple surgeries on buttocks / perineum for ? Fistulas / Abscess  Reports ?  Boils build then drain     Review of Systems:  Symptom Y/N Comments  Symptom Y/N Comments   Fever/Chills n   Chest Pain n    Poor Appetite n   Edema n    Cough n   Abdominal Pain n    Sputum n   Joint Pain y R knee   SOB/PARDO n   Pruritis/Rash y Buttocks   Nausea/vomit n   Tolerating PT/OT     Diarrhea n   Tolerating Diet y    Constipation n   Other         Current Facility-Administered Medications:     metoprolol tartrate (LOPRESSOR) tablet 25 mg, 25 mg, Oral, BID, Nelia Epperson MD, 25 mg at 11/15/23 0902    traZODone (DESYREL) tablet 100 mg, 100 mg, Oral, Nightly, Riley Whalen DO, 100 mg at 23 0347    cefTRIAXone (ROCEPHIN) 1,000 mg in sodium chloride 0.9 % 50 mL IVPB (mini-bag), 1,000 mg, IntraVENous, Q24H, Riley Whalen DO, Stopped at 11/15/23 0249    sodium chloride flush 0.9 % injection 5-40 mL, 5-40 mL, indicated. 4.  I have explained to him the limited options that we have, and that they essentially boil down to going to a shelter in [facility], paying for a motel, or the temporary possibility of transitioning to skilled nursing facility if he qualifies. 5.  I will follow along with you as indicated, but please call if further questions or concerns arise. Rafaela Pride MD    ORTHOPEDICS  1. Right knee pain in a chronic nonambulator. 2.  Severe osteopenia from disuse. 3.  Possible bone contusion versus focal fracture of the anterior lateral tibial plateau with no displacement. 4.  Probable complex medial meniscus tear and degenerative change right knee. Plan:  I had a discussion with the patient as well as Dr. Dennise Lira. I do not see anything in his knee that needs specific orthopedic treatment at this time. He is a nonambulator. He can certainly ambulate on the meniscus tear for short distances and I would not recommend surgical treatment for this at this time. I am not sure the abnormality seen on the anterior lateral tibia represents a true fracture of the tibial plateau. It does not appear to extend into the subcortical bone of the joint and it does not appear to extend across the bone and it may represent a bone contusion or some random finding on MRI scan. It  does not appear to correlate any significant tenderness on exam.   I would recommend a trial of physical therapy for the knee. I spoke with Dr. Dennise Lira and we discussed possibly a steroid injection. I would recommend seeing how he does with therapy and with attempts to ambulate him out of bed or at least get in bed to chair. I do not see anything in the MRI scan that would prevent us from getting him out of bed into a chair. He can be weightbearing as tolerated on the knee. We can assess better his function with physical therapy. I do not anticipate any surgical treatment at this time.   Ilia Velásquez MD    Procedures: see

## 2023-11-15 NOTE — PLAN OF CARE
Problem: Occupational Therapy - Adult  Goal: By Discharge: Performs self-care activities at highest level of function for planned discharge setting. See evaluation for individualized goals. Description: FUNCTIONAL STATUS PRIOR TO ADMISSION:  Pt was not ambulatory. Conflicting reports for ability to complete self care. Today he reports he can do his own hair, grooming, but needed help for gissel-care and changing undergarment. Receives Help From:  (no one now), ADL Assistance: Needs assistance, Bath:  (chart indicates needed assist, he says he can mostly perform in sitting or laying), Dressing:  (dependent for undergarment, able to get on tshirt), Grooming:  (set-up), Feeding: Independent, Toileting: Needs assistance (reports wears depends and is dependent for gissel-care), Homemaking Assistance: Needs assistance, Ambulation Assistance: Non-ambulatory, Transfer Assistance: Needs assistance, Active : No     HOME SUPPORT: Patient lived GF but cannot go back to that setting. Chart indicates he has no close family or friends to help. Occupational Therapy Goals:  Initiated 11/15/2023  1. Patient will perform grooming with Set-up within 7 day(s). 2.  Patient will perform bathing with Minimal Assist within 7 day(s). 3.  Patient will perform lower body dressing with Moderate Assist within 7 day(s). 4.  Patient will perform toilet transfers using transfer equipment with Additional Time  within 7 day(s). 5.  Patient will perform all aspects of toileting with Minimal Assist within 7 day(s). 6.  Patient will participate in upper extremity therapeutic exercise/activities with Set-up for 8 minutes within 7 day(s). 7.  Patient will utilize energy conservation techniques during functional activities with verbal cues within 7 day(s).     Outcome: Progressing   OCCUPATIONAL THERAPY EVALUATION    Patient: Gladys Smith (89 y.o. male)  Date: 11/15/2023  Primary Diagnosis: Cellulitis [L03.90]  Cellulitis, gluteal, left independent is allowed. Heber Merlin., Barthel, D.W. ([id number]). Functional evaluation: the Barthel Index. [address] (14)2. DELMAR Trammell, Wellington Daniels.Michaela., [facility], [address] (1999). Measuring the change indisability after inpatient rehabilitation; comparison of the responsiveness of the Barthel Index and Functional [place] Measure. Journal of Neurology, Neurosurgery, and Psychiatry, 66(4), 890-131. Cezar Haskins NMANJIT, LAXMI Aguirre, & Myesha Oliva M.A. (2004.) Assessment of post-stroke quality of life in cost-effectiveness studies: The usefulness of the Barthel Index and the EuroQoL-5D. Quality of Life Research, 13, 427-43       Pain Rating:  Pt complained of sharp pain while in the best move device during sit to stand and stand to sit transfer  Pain Intervention(s):   repositioning    Activity Tolerance:   Poor    After treatment:   Radiology presents to take pt for additional study    COMMUNICATION/EDUCATION:   The patient's plan of care was discussed with: physical therapist, , rehabilitation technician, and certified nursing assistant/patient care technician    Patient Education  Education Given To: Patient  Education Provided: Role of Therapy;Plan of Care;Transfer Training;ADL Adaptive Strategies  Education Method: Demonstration;Verbal;Teach Back  Barriers to Learning: None  Education Outcome: Verbalized understanding;Demonstrated understanding    Thank you for this referral.  Deo Escalante OT  Minutes: 14    Occupational Therapy Evaluation Charge Determination   History Examination Decision-Making   MEDIUM Complexity : Expanded review of history including physical, cognitive and psychocial history  MEDIUM Complexity: 3-5 Performance deficits relating to physical, cognitive, or psychosocial skills that result in activity limitations and/or participation restrictions MEDIUM Complexity: Patient may present with comorbidities that affect occupational performance.

## 2023-11-15 NOTE — CARE COORDINATION
Transition of Care Plan:    RUR: 11% LOW   Prior Level of Functioning:   Disposition:   If SNF or IPR: Date FOC offered:   Date FOC received:   Accepting facility:   Date authorization started with reference number:   Date authorization received and expires: Follow up appointments:   DME needed: TBD   Transportation at discharge:   IM/IMM Medicare/ letter given:   Is patient a [facility] and connected with VA? If yes, was [employer] transfer form completed and VA notified? Caregiver Contact:   Discharge Caregiver contacted prior to discharge? Care Conference needed? Barriers to discharge: Medical instability/placement. 1000: IDR Team; MD, Care Manager,Nursing, Nursing Supervisor,Therapy met to review patient's plan of care. Discussed goals, interventions, barriers and progress. RUR:  11%     Team will continue to monitor progress and report any concerns to the physician and care management as indicated. Transition of Care Plan: Per MD, patient has chronic skin problem around perineum. Abscesses that drain on his bottom. Was living on couch for 3 years. Has rectal fistulas. MD said patient had knee issues but xray was negative. Ortho to see patient today but would like MRI first. PT said patient yesterday and he was wobbly per MD. MD said patient drinks 1/2 bottle whiskey a day. 1430: Patient requiring a level II due to have schizophrenia. Completed [facility] up to the level II part. Called [facility] and spoke with Kennedi Saeed. Said fax info to [phone]. Info faxed along with UAI up to the level II part. Kennedi Saeed said he would call with next steps. Faxed to [phone]: Spoke with Kennedi Saeed again from [facility]. He said according to psych notes he does NOT meet criteria for mental illness. Kennedi Saeed said NO level II required. He will send me a \"not warranted\" letter when he receives it. Will continue completing UAI.  Referral sent to [facility] for SNF w/transition to LTC

## 2023-11-15 NOTE — PLAN OF CARE
Problem: Skin/Tissue Integrity  Goal: Absence of new skin breakdown  Description: 1. Monitor for areas of redness and/or skin breakdown  2. Assess vascular access sites hourly  3. Every 4-6 hours minimum:  Change oxygen saturation probe site  4. Every 4-6 hours:  If on nasal continuous positive airway pressure, respiratory therapy assess nares and determine need for appliance change or resting period. Outcome: Progressing     Problem: ABCDS Injury Assessment  Goal: Absence of physical injury  Outcome: Progressing     Problem: Pain  Goal: Verbalizes/displays adequate comfort level or baseline comfort level  Outcome: Progressing     Problem: Physical Therapy - Adult  Goal: By Discharge: Performs mobility at highest level of function for planned discharge setting. See evaluation for individualized goals. Description: FUNCTIONAL STATUS PRIOR TO ADMISSION: Pt was living w/ his ex-GF who was his CG but now he has been evicted and has a restraining order in place so he is not even allowed to contact her; he will not be going back to her home. He is now homeless w/ dx of schizophrenia. Apparently pt has been living on her couch for 2-3 years. He does not get up, does not normally get dressed nor wear shoes, he does not transfer, he does not stand and per his report he does not even sit upright on the couch. Pt states that he has not even attempted to stand for at least 2.5 years. He \"washes up\" w/ body wipes, has bowel movements in his adult briefs which ex-GF/CG was helping him change and addressing his pericare, he urinates in a jug and is essentially dependent on her for all IADLs. He did have a small refrigerator next to the couch that he kept some food in. He noted that he has not left the house since Covid-19. He expressed that he drinks alcohol, smokes cigarettes and takes cannabis gummies to help w/ his pain from R knee and L gluteal abscesses.      HOME SUPPORT PRIOR TO ADMISSION: as noted above    Physical

## 2023-11-15 NOTE — PLAN OF CARE
Problem: Skin/Tissue Integrity  Goal: Absence of new skin breakdown  Description: 1. Monitor for areas of redness and/or skin breakdown  2. Assess vascular access sites hourly  3. Every 4-6 hours minimum:  Change oxygen saturation probe site  4. Every 4-6 hours:  If on nasal continuous positive airway pressure, respiratory therapy assess nares and determine need for appliance change or resting period.   11/15/2023 0919 by Tremayne Bar RN  Outcome: Progressing  11/15/2023 0618 by Fuentes Neri RN  Outcome: Progressing     Problem: ABCDS Injury Assessment  Goal: Absence of physical injury  11/15/2023 0919 by Tremayne Bar RN  Outcome: Progressing  11/15/2023 0618 by Fuentes Neri RN  Outcome: Progressing     Problem: Pain  Goal: Verbalizes/displays adequate comfort level or baseline comfort level  11/15/2023 0919 by Tremayne Bar RN  Outcome: Progressing  Flowsheets (Taken 11/15/2023 0801)  Verbalizes/displays adequate comfort level or baseline comfort level: Encourage patient to monitor pain and request assistance  11/15/2023 0618 by Fuentes Neri RN  Outcome: Progressing

## 2023-11-15 NOTE — PROGRESS NOTES
0730: report given to this nurse from Eun Bhakta RN    1130: MRI paper checklist complete  Haydee Shock RN    [phone]: pt transferred to MRI  LETICIA Hyatt RN    8005: pt returns from MRI  AKathy Bear RN    2267: MD Lamar Mobley made aware pt working with PT and up to w/c blood pressure 200/114, pt assisted back to bed blood pressure 186/66, no new orders at this time  Haydee Plummer RN    [phone]: MD Negro at the bedside to assess pt, MD assess BLE, pt has minimal movement to RLE, MD makes pt aware no surgery needed at this time, sitter remains at pt bedside  Haydee Plummer RN

## 2023-11-15 NOTE — PLAN OF CARE
Problem: Physical Therapy - Adult  Goal: By Discharge: Performs mobility at highest level of function for planned discharge setting. See evaluation for individualized goals. Description: FUNCTIONAL STATUS PRIOR TO ADMISSION: Pt was living w/ his ex-GF who was his CG but now he has been evicted and has a restraining order in place so he is not even allowed to contact her; he will not be going back to her home. He is now homeless w/ dx of schizophrenia. Apparently pt has been living on her couch for 2-3 years. He does not get up, does not normally get dressed nor wear shoes, he does not transfer, he does not stand and per his report he does not even sit upright on the couch. Pt states that he has not even attempted to stand for at least 2.5 years. He \"washes up\" w/ body wipes, has bowel movements in his adult briefs which ex-GF/CG was helping him change and addressing his pericare, he urinates in a jug and is essentially dependent on her for all IADLs. He did have a small refrigerator next to the couch that he kept some food in. He noted that he has not left the house since Covid-19. He expressed that he drinks alcohol, smokes cigarettes and takes cannabis gummies to help w/ his pain from R knee and L gluteal abscesses. HOME SUPPORT PRIOR TO ADMISSION: as noted above    Physical Therapy Goals  Initiated 11/14/2023  1. Patient will move from supine to sit and sit to supine in bed with modified independence within 7 day(s). 2.  Patient will tolerate sitting on EOB x10 minutes with SBA to prepare for additional out of bed activities within 7 day(s). 3.  Patient will transfer from bed to chair and chair to bed with moderate assistance using the least restrictive device or slide board within 7 day(s).     Outcome: Progressing   PHYSICAL THERAPY TREATMENT    Patient: Wilmar Hightower (57 y.o. male)  Date: 11/15/2023  Diagnosis: Cellulitis [L03.90]  Cellulitis, gluteal, left [L03.317] Cellulitis      Precautions: Strategies; Equipment  Education Provided Comments: cues offered for safety and technique when performing bed mobility and when sitting on EOB to reduce fall risk; pt also instructed in the importance of using the hospital bed to acclimate to upright sitting and using pillows for pressure relief off L buttock; reviewed use and safety of sliding board and w/c including ensuring brakes are locked prior to transferring  Education Method: Demonstration;Verbal  Barriers to Learning: None  Education Outcome: Verbalized understanding;Demonstrated understanding;Continued education needed      Althia Crigler, PT  Minutes: 46

## 2023-11-15 NOTE — CONSULTS
[address]    Name:  Marti Babin  MR#:  [id number]  :  1975  ACCOUNT #:  [de-identified]  DATE OF SERVICE:  11/15/2023    ATTENDING PHYSICIAN:  Roque Wood MD    REASON FOR CONSULTATION:  Evaluate for possible depression and suicidality. HISTORY OF PRESENT ILLNESS:  The patient is a 50-year-old single male who was admitted medically on 2023 with apparent worsening of some gluteal and perianal cellulitis secondary to longstanding abscesses/wounds for which he has had multiple surgeries and chronic treatment. He states that these wounds have rendered him fully disabled and apparently, he does receive SSDI over 900 dollars a month because of it. He also states that he has essentially been bedridden for 5 years, and that he was living with his \"girlfriend\" for the past 12 years sharing a house. However, he indicated that she got tired of taking care of him and kicked him out. He tells me today that she actually obtained a protective order against him, possibly for threatening to harm her or shoot her, etc.    In the context of being admitted. He has made comments about being depressed, believing there is no point in living, and referencing possible suicide. He states he has taken a couple of overdoses in the past, and there are records to suggest he may have taken a small overdose of Seroquel 2019. He also tells me he took \"a whole bottle of trazodone,\" but it did not harm him. He denies that he has actual suicidal intent at this point, and mostly indicates that he feels his situation is hopeless because of the social factors (homeless, no support, perceived inability to care for himself). While he is saying all this, it should be noted that his mood is full and bright, he actually is joking slightly, and there is no indication of any endogenous or clinical depressive symptoms at all.   There is also no evidence of any aracelis or mood instability, nor any

## 2023-11-15 NOTE — PROGRESS NOTES
OTR has reviewed chart and stopped by to assess patient approx 11:25. CNA inquired if the best move could be trialled. OTR brought equip and nurse was getting info for pending MRI. He then refused to get up. He refused to attempt transfer to w/c to go to MRI stating they would take him in the bed. OTR will make one more attempt to see pt before leaving facility.     Trevor Wiley MS, OTR/L

## 2023-11-16 PROCEDURE — 6360000002 HC RX W HCPCS: Performed by: INTERNAL MEDICINE

## 2023-11-16 PROCEDURE — 97535 SELF CARE MNGMENT TRAINING: CPT

## 2023-11-16 PROCEDURE — 97110 THERAPEUTIC EXERCISES: CPT

## 2023-11-16 PROCEDURE — 6370000000 HC RX 637 (ALT 250 FOR IP): Performed by: INTERNAL MEDICINE

## 2023-11-16 PROCEDURE — 1100000000 HC RM PRIVATE

## 2023-11-16 PROCEDURE — 6370000000 HC RX 637 (ALT 250 FOR IP): Performed by: STUDENT IN AN ORGANIZED HEALTH CARE EDUCATION/TRAINING PROGRAM

## 2023-11-16 PROCEDURE — 6360000002 HC RX W HCPCS: Performed by: STUDENT IN AN ORGANIZED HEALTH CARE EDUCATION/TRAINING PROGRAM

## 2023-11-16 RX ORDER — KETOROLAC TROMETHAMINE 30 MG/ML
30 INJECTION, SOLUTION INTRAMUSCULAR; INTRAVENOUS EVERY 6 HOURS PRN
Status: DISCONTINUED | OUTPATIENT
Start: 2023-11-16 | End: 2023-11-17 | Stop reason: HOSPADM

## 2023-11-16 RX ORDER — NICOTINE 21 MG/24HR
1 PATCH, TRANSDERMAL 24 HOURS TRANSDERMAL DAILY
Status: DISCONTINUED | OUTPATIENT
Start: 2023-11-16 | End: 2023-11-17 | Stop reason: HOSPADM

## 2023-11-16 RX ADMIN — LORAZEPAM 0.5 MG: 0.5 TABLET ORAL at 09:07

## 2023-11-16 RX ADMIN — CEPHALEXIN 500 MG: 250 CAPSULE ORAL at 13:45

## 2023-11-16 RX ADMIN — METOPROLOL TARTRATE 50 MG: 50 TABLET ORAL at 20:50

## 2023-11-16 RX ADMIN — ACETAMINOPHEN 650 MG: 325 TABLET ORAL at 09:07

## 2023-11-16 RX ADMIN — MELOXICAM 15 MG: 7.5 TABLET ORAL at 16:46

## 2023-11-16 RX ADMIN — FAMOTIDINE 20 MG: 20 TABLET ORAL at 20:50

## 2023-11-16 RX ADMIN — TRAZODONE HYDROCHLORIDE 100 MG: 100 TABLET ORAL at 20:50

## 2023-11-16 RX ADMIN — ENOXAPARIN SODIUM 40 MG: 100 INJECTION SUBCUTANEOUS at 09:16

## 2023-11-16 RX ADMIN — AMLODIPINE BESYLATE 10 MG: 10 TABLET ORAL at 09:07

## 2023-11-16 RX ADMIN — KETOROLAC TROMETHAMINE 30 MG: 30 INJECTION, SOLUTION INTRAMUSCULAR; INTRAVENOUS at 20:50

## 2023-11-16 RX ADMIN — METOPROLOL TARTRATE 50 MG: 50 TABLET ORAL at 09:07

## 2023-11-16 RX ADMIN — LORAZEPAM 0.5 MG: 0.5 TABLET ORAL at 20:50

## 2023-11-16 RX ADMIN — CEPHALEXIN 500 MG: 250 CAPSULE ORAL at 21:07

## 2023-11-16 RX ADMIN — CEPHALEXIN 500 MG: 250 CAPSULE ORAL at 06:23

## 2023-11-16 ASSESSMENT — PAIN SCALES - GENERAL
PAINLEVEL_OUTOF10: 9
PAINLEVEL_OUTOF10: 3
PAINLEVEL_OUTOF10: 9
PAINLEVEL_OUTOF10: 8
PAINLEVEL_OUTOF10: 5
PAINLEVEL_OUTOF10: 3
PAINLEVEL_OUTOF10: 3

## 2023-11-16 ASSESSMENT — PAIN DESCRIPTION - DESCRIPTORS
DESCRIPTORS: THROBBING;STABBING
DESCRIPTORS: THROBBING;STABBING
DESCRIPTORS: ACHING
DESCRIPTORS: ACHING

## 2023-11-16 ASSESSMENT — PAIN DESCRIPTION - LOCATION
LOCATION: KNEE
LOCATION: BACK;LEG
LOCATION: KNEE

## 2023-11-16 ASSESSMENT — PAIN DESCRIPTION - ORIENTATION
ORIENTATION: RIGHT;LEFT
ORIENTATION: RIGHT
ORIENTATION: RIGHT

## 2023-11-16 NOTE — PLAN OF CARE
Problem: Skin/Tissue Integrity  Goal: Absence of new skin breakdown  Description: 1. Monitor for areas of redness and/or skin breakdown  2. Assess vascular access sites hourly  3. Every 4-6 hours minimum:  Change oxygen saturation probe site  4. Every 4-6 hours:  If on nasal continuous positive airway pressure, respiratory therapy assess nares and determine need for appliance change or resting period. Outcome: Not Progressing     Problem: ABCDS Injury Assessment  Goal: Absence of physical injury  Outcome: Progressing  Flowsheets (Taken 11/16/2023 0722)  Absence of Physical Injury: Implement safety measures based on patient assessment     Problem: Occupational Therapy - Adult  Goal: By Discharge: Performs self-care activities at highest level of function for planned discharge setting. See evaluation for individualized goals. Description: FUNCTIONAL STATUS PRIOR TO ADMISSION:  Pt was not ambulatory. Conflicting reports for ability to complete self care. Today he reports he can do his own hair, grooming, but needed help for gissel-care and changing undergarment. Receives Help From:  (no one now), ADL Assistance: Needs assistance, Bath:  (chart indicates needed assist, he says he can mostly perform in sitting or laying), Dressing:  (dependent for undergarment, able to get on tshirt), Grooming:  (set-up), Feeding: Independent, Toileting: Needs assistance (reports wears depends and is dependent for gissel-care), Homemaking Assistance: Needs assistance, Ambulation Assistance: Non-ambulatory, Transfer Assistance: Needs assistance, Active : No     HOME SUPPORT: Patient lived GF but cannot go back to that setting. Chart indicates he has no close family or friends to help. Occupational Therapy Goals:  Initiated 11/15/2023  1. Patient will perform grooming with Set-up within 7 day(s). 2.  Patient will perform bathing with Minimal Assist within 7 day(s).   3.  Patient will perform lower body dressing with

## 2023-11-16 NOTE — PLAN OF CARE
Problem: Physical Therapy - Adult  Goal: By Discharge: Performs mobility at highest level of function for planned discharge setting. See evaluation for individualized goals. Description: FUNCTIONAL STATUS PRIOR TO ADMISSION: Pt was living w/ his ex-GF who was his CG but now he has been evicted and has a restraining order in place so he is not even allowed to contact her; he will not be going back to her home. He is now homeless w/ dx of schizophrenia. Apparently pt has been living on her couch for 2-3 years. He does not get up, does not normally get dressed nor wear shoes, he does not transfer, he does not stand and per his report he does not even sit upright on the couch. Pt states that he has not even attempted to stand for at least 2.5 years. He \"washes up\" w/ body wipes, has bowel movements in his adult briefs which ex-GF/CG was helping him change and addressing his pericare, he urinates in a jug and is essentially dependent on her for all IADLs. He did have a small refrigerator next to the couch that he kept some food in. He noted that he has not left the house since Covid-19. He expressed that he drinks alcohol, smokes cigarettes and takes cannabis gummies to help w/ his pain from R knee and L gluteal abscesses. HOME SUPPORT PRIOR TO ADMISSION: as noted above    Physical Therapy Goals  Initiated 11/14/2023  1. Patient will move from supine to sit and sit to supine in bed with modified independence within 7 day(s). 2.  Patient will tolerate sitting on EOB x10 minutes with SBA to prepare for additional out of bed activities within 7 day(s). 3.  Patient will transfer from bed to chair and chair to bed with moderate assistance using the least restrictive device or slide board within 7 day(s).     Outcome: Progressing   PHYSICAL THERAPY TREATMENT    Patient: Radha Benitez (73 y.o. male)  Date: 11/16/2023  Diagnosis: Cellulitis [L03.90]  Cellulitis, gluteal, left [L03.317] Cellulitis      Precautions: Training: Yes  Overall Level of Assistance: Modified independent; Additional time  Interventions: Safety awareness training;Verbal cues  Rolling: Modified independent; Adaptive equipment  Supine to Sit: Modified independent; Adaptive equipment; Additional time  Sit to Supine: Modified independent  Scooting: Modified independent; Additional time  Transfers:  Transfer Training  Transfer Training: Yes  Overall Level of Assistance: Stand-by assistance; Additional time  Interventions: Safety awareness training;Verbal cues  Bed to Chair: Stand-by assistance;Contact-guard assistance; Additional time  Balance:  Balance  Sitting: Intact  Standing:  (NT)   Ambulation/Gait Training:   NT     Neuro Re-Education:            Therapeutic Exercises:     EXERCISE   Sets   Reps   Active Active Assist   Passive Self ROM   Comments   Ankle Pumps 2 15 [x] [x] [] []    Quad Sets 2 10 [x] [] [] []    Hamstring Sets   [] [] [] []    Gluteal Sets 1 10 [] [] [] []    Short Arc Quads 3 5 [x] [x] [] []    Heel Slides 3 5 [] [x] [] []    Straight Leg Raises 2 5 [x] [x] [] []    Hip abd/add 1 10 [] [x] [] []    Long Arc Quads   [] [] [] []    Marching   [] [] [] []       [] [] [] []            Pain Ratin-9/10 R knee and low back   Pain Intervention(s):   nursing notified and addressing, ice, rest, elevation, and repositioning    Activity Tolerance:   Fair , Poor, requires frequent rest breaks, and SpO2 stable on room air    After treatment:   Patient left in no apparent distress sitting up in chair, Call bell within reach, Heels elevated for pressure relief, and 1:1 sitter present      COMMUNICATION/EDUCATION:   The patient's plan of care was discussed with: occupational therapist, registered nurse, physician, , and certified nursing assistant/patient care technician    Patient Education  Education Given To: Patient  Education Provided: Role of Therapy;Plan of Care;Home Exercise Program;Precautions;Transfer Training; Fall Prevention

## 2023-11-16 NOTE — CARE COORDINATION
Transition of Care Plan:     RUR: 11% LOW   Prior Level of Functioning:   Disposition:   If SNF or IPR: Date FOC offered:   Date FOC received:   Accepting facility:   Date authorization started with reference number:   Date authorization received and expires: Follow up appointments:   DME needed: TBD   Transportation at discharge:   IM/IMM Medicare/ letter given:   Is patient a [place] and connected with VA? If yes, was [employer] transfer form completed and VA notified? Caregiver Contact:   Discharge Caregiver contacted prior to discharge? Care Conference needed? Barriers to discharge: placement. 9349: Patient [facility] at [facility]    1100: IDR Team; MD, Care Manager, Pharmacy, Nursing, Nursing Supervisor,, RD, Therapy met to review patient's plan of care. Discussed goals, interventions, barriers and progress. RUR:  6%     Team will continue to monitor progress and report any concerns to the physician and care management as indicated. Transition of Care Plan: Per MD, patient here for cellulitis. Bed bound d/t leg injury. No redness on leg. Seen by PT/OT. OT said patient stood on eob. Washed ankles. Put on shirt and pants (while laying down). OT said plan to get to wheelchair. CM said patient is placmenet issue. Does NOT need a level II per Chiquis Connor at [facility]       1430: Per OT/PT patient may do well in a group home situation. Called Jen. No answer. [facility]. Spoke with representative. Apparently it is only for children. Referral sent to [facility].      [address] [phone]. Spoke with [facility]. Said doesn't have permanent care coordinator. Dariela Raza would have to refer him to permanent care coordinator. Since he doesn't have permanent care coordinator right now, [facility] will have to figure out how to assign permanent care coordinator. Angela will call me back. 1500: UAI completed.

## 2023-11-16 NOTE — PLAN OF CARE
Problem: Occupational Therapy - Adult  Goal: By Discharge: Performs self-care activities at highest level of function for planned discharge setting. See evaluation for individualized goals. Description: FUNCTIONAL STATUS PRIOR TO ADMISSION:  Pt was not ambulatory. Conflicting reports for ability to complete self care. Today he reports he can do his own hair, grooming, but needed help for gissel-care and changing undergarment. Receives Help From:  (no one now), ADL Assistance: Needs assistance, Bath:  (chart indicates needed assist, he says he can mostly perform in sitting or laying), Dressing:  (dependent for undergarment, able to get on tshirt), Grooming:  (set-up), Feeding: Independent, Toileting: Needs assistance (reports wears depends and is dependent for gissel-care), Homemaking Assistance: Needs assistance, Ambulation Assistance: Non-ambulatory, Transfer Assistance: Needs assistance, Active : No     HOME SUPPORT: Patient lived GF but cannot go back to that setting. Chart indicates he has no close family or friends to help. Occupational Therapy Goals:  Initiated 11/15/2023  1. Patient will perform grooming with Set-up within 7 day(s). 2.  Patient will perform bathing with Minimal Assist within 7 day(s). 3.  Patient will perform lower body dressing with Moderate Assist within 7 day(s). 4.  Patient will perform toilet transfers using transfer equipment with Additional Time  within 7 day(s). 5.  Patient will perform all aspects of toileting with Minimal Assist within 7 day(s). 6.  Patient will participate in upper extremity therapeutic exercise/activities with Set-up for 8 minutes within 7 day(s). 7.  Patient will utilize energy conservation techniques during functional activities with verbal cues within 7 day(s).     Outcome: Progressing   OCCUPATIONAL THERAPY TREATMENT  Patient: David Stephens (39 y.o. male)  Date: 11/16/2023  Primary Diagnosis: Cellulitis [L03.90]  Cellulitis, gluteal, left

## 2023-11-17 VITALS
WEIGHT: 213.19 LBS | DIASTOLIC BLOOD PRESSURE: 89 MMHG | HEART RATE: 84 BPM | BODY MASS INDEX: 27.36 KG/M2 | HEIGHT: 74 IN | SYSTOLIC BLOOD PRESSURE: 148 MMHG | RESPIRATION RATE: 20 BRPM | TEMPERATURE: 97.6 F | OXYGEN SATURATION: 96 %

## 2023-11-17 PROCEDURE — 6370000000 HC RX 637 (ALT 250 FOR IP): Performed by: STUDENT IN AN ORGANIZED HEALTH CARE EDUCATION/TRAINING PROGRAM

## 2023-11-17 PROCEDURE — 6370000000 HC RX 637 (ALT 250 FOR IP): Performed by: INTERNAL MEDICINE

## 2023-11-17 PROCEDURE — 6360000002 HC RX W HCPCS: Performed by: STUDENT IN AN ORGANIZED HEALTH CARE EDUCATION/TRAINING PROGRAM

## 2023-11-17 RX ORDER — TRAZODONE HYDROCHLORIDE 100 MG/1
100 TABLET ORAL NIGHTLY
Qty: 30 TABLET | Refills: 0 | Status: SHIPPED | OUTPATIENT
Start: 2023-11-17 | End: 2023-12-17

## 2023-11-17 RX ORDER — CEPHALEXIN 500 MG/1
500 CAPSULE ORAL 2 TIMES DAILY
Qty: 10 CAPSULE | Refills: 0 | Status: SHIPPED | OUTPATIENT
Start: 2023-11-17 | End: 2023-11-22

## 2023-11-17 RX ORDER — TRAMADOL HYDROCHLORIDE 50 MG/1
50 TABLET ORAL EVERY 6 HOURS PRN
Qty: 10 TABLET | Refills: 0 | Status: SHIPPED | OUTPATIENT
Start: 2023-11-17 | End: 2023-11-25

## 2023-11-17 RX ORDER — POLYETHYLENE GLYCOL 3350 17 G/17G
17 POWDER, FOR SOLUTION ORAL DAILY PRN
Qty: 30 EACH | Refills: 0 | Status: SHIPPED | OUTPATIENT
Start: 2023-11-17 | End: 2023-12-17

## 2023-11-17 RX ADMIN — METOPROLOL TARTRATE 50 MG: 50 TABLET ORAL at 08:05

## 2023-11-17 RX ADMIN — AMLODIPINE BESYLATE 10 MG: 10 TABLET ORAL at 08:05

## 2023-11-17 RX ADMIN — CEPHALEXIN 500 MG: 250 CAPSULE ORAL at 07:08

## 2023-11-17 RX ADMIN — ENOXAPARIN SODIUM 40 MG: 100 INJECTION SUBCUTANEOUS at 08:05

## 2023-11-17 RX ADMIN — LORAZEPAM 0.5 MG: 0.5 TABLET ORAL at 07:08

## 2023-11-17 NOTE — PLAN OF CARE
Problem: Skin/Tissue Integrity  Goal: Absence of new skin breakdown  Description: 1. Monitor for areas of redness and/or skin breakdown  2. Assess vascular access sites hourly  3. Every 4-6 hours minimum:  Change oxygen saturation probe site  4. Every 4-6 hours:  If on nasal continuous positive airway pressure, respiratory therapy assess nares and determine need for appliance change or resting period. 11/16/2023 2027 by Whitney Barajas RN  Outcome: Progressing  11/16/2023 1359 by Lisbeth Myers RN  Outcome: Not Progressing     Problem: ABCDS Injury Assessment  Goal: Absence of physical injury  11/16/2023 2027 by Whitney Barajas RN  Outcome: Progressing  11/16/2023 1359 by Lisbeth Myers RN  Outcome: Progressing  Flowsheets (Taken 11/16/2023 4364)  Absence of Physical Injury: Implement safety measures based on patient assessment     Problem: Pain  Goal: Verbalizes/displays adequate comfort level or baseline comfort level  Outcome: Progressing  Flowsheets (Taken 11/16/2023 0722 by Lisbeth Myers RN)  Verbalizes/displays adequate comfort level or baseline comfort level: Encourage patient to monitor pain and request assistance     Problem: Physical Therapy - Adult  Goal: By Discharge: Performs mobility at highest level of function for planned discharge setting. See evaluation for individualized goals. Description: FUNCTIONAL STATUS PRIOR TO ADMISSION: Pt was living w/ his ex-GF who was his CG but now he has been evicted and has a restraining order in place so he is not even allowed to contact her; he will not be going back to her home. He is now homeless w/ dx of schizophrenia. Apparently pt has been living on her couch for 2-3 years. He does not get up, does not normally get dressed nor wear shoes, he does not transfer, he does not stand and per his report he does not even sit upright on the couch. Pt states that he has not even attempted to stand for at least 2.5 years.  He \"washes up\" w/ body wipes, has bowel movements in his adult briefs which ex-GF/CG was helping him change and addressing his pericare, he urinates in a jug and is essentially dependent on her for all IADLs. He did have a small refrigerator next to the couch that he kept some food in. He noted that he has not left the house since Covid-19. He expressed that he drinks alcohol, smokes cigarettes and takes cannabis gummies to help w/ his pain from R knee and L gluteal abscesses. HOME SUPPORT PRIOR TO ADMISSION: as noted above    Physical Therapy Goals  Initiated 11/14/2023  1. Patient will move from supine to sit and sit to supine in bed with modified independence within 7 day(s). 2.  Patient will tolerate sitting on EOB x10 minutes with SBA to prepare for additional out of bed activities within 7 day(s). 3.  Patient will transfer from bed to chair and chair to bed with moderate assistance using the least restrictive device or slide board within 7 day(s). 11/16/2023 1709 by Virginie Rosales PT  Outcome: Progressing     Problem: Occupational Therapy - Adult  Goal: By Discharge: Performs self-care activities at highest level of function for planned discharge setting. See evaluation for individualized goals. Description: FUNCTIONAL STATUS PRIOR TO ADMISSION:  Pt was not ambulatory. Conflicting reports for ability to complete self care. Today he reports he can do his own hair, grooming, but needed help for gissel-care and changing undergarment.   Receives Help From:  (no one now), ADL Assistance: Needs assistance, Bath:  (chart indicates needed assist, he says he can mostly perform in sitting or laying), Dressing:  (dependent for undergarment, able to get on tshirt), Grooming:  (set-up), Feeding: Independent, Toileting: Needs assistance (reports wears depends and is dependent for gissel-care), Homemaking Assistance: Needs assistance, Ambulation Assistance: Non-ambulatory, Transfer Assistance: Needs assistance, Active : No

## 2023-11-17 NOTE — DISCHARGE SUMMARY
Discharge Summary    Name: Dayne Jo  [id number]  YOB: 1975 (Age: 51 y.o.)   Date of Admission: 11/13/2023  Date of Discharge: 11/17/2023  Attending Physician: Lynsey Acevedo MD    Discharge Diagnosis:   Acute on chronic and recurrent cellulitis of the buttocks + perineum  Generalized weakness with chronic right knee pain  Depression with malingering by making suicidal statements    Secondary diagnosis:  Alcohol use  Active tobacco abuse  Hypertension    Consultations:  IP CONSULT TO PSYCHIATRY  IP CONSULT TO ORTHOPEDIC SURGERY      Brief Admission History/Reason for Admission   Mr. Fink is a 51year old male with a medical history significant for recurrent gluteal and perineal abscesses, chronic knee pain which has essentially left him bed bound, depression, hypertension, alcohol and tobacco abuse who presented to the ED with a multitude of complaints. He notes he was previously living with his girlfriend who was his care giver however after a disagreement he was asked to leave his residence leaving him essentially homeless with feelings of helplessness and hopelessness. He had no fever or chills however presented to the ED for evaluation. Please see H&P for additional details. Brief Hospital Course by Main Problems: On the acute medicine floor he was empirically placed on IV antibiotics with local wound care performed by nursing staff. Given statements regarding feeling like he has \"no reason to live\" he was placed 1:1 and Psychiatry was consulted who noted he was predominantly malingering with no evidence of any actual clinicall depression or other psychiatric illness at this point. Patient was seen by PT with recommendation for skilled nursing + PT at discharge. At time of discharge he was medically stable. Discharge Exam:  Patient seen and examined by me on discharge day.   Pertinent Findings:  Patient Vitals for the past 24 hrs:   BP Temp

## 2023-11-17 NOTE — PROGRESS NOTES
Verbal report given to Liz Rivera at [facility] and rehab. Opportunity for questions/concerns given.

## 2023-11-17 NOTE — PROGRESS NOTES
Called [facility] to arrange a BLS transport to [facility] and [employer]. Spoke with Sasha Zhang went over diagnosis and equipment (none.)  ETA of on scene given.

## 2023-11-17 NOTE — CARE COORDINATION
Transition of Care Plan:     RUR: 7% LOW   Prior Level of Functioning: Required assistance w/adls/iadls. Disposition:   If SNF or IPR: Date FOC offered:   Date FOC received:   Accepting facility:   Date authorization started with reference number:   Date authorization received and expires: Follow up appointments:   DME needed: TBD   Transportation at discharge:   IM/IMM Medicare/ letter given:   Is patient a [place] and connected with VA? If yes, was [employer] transfer form completed and VA notified? Caregiver Contact:   Discharge Caregiver contacted prior to discharge? Care Conference needed? Barriers to discharge: placement. 0129: It appears [facility] patient. Messaged admissions Sabrina Peaches to see if this is correct. Waiting to hear back. Patient's qualifying inpatient midnights include 11/14, 11/15 an 11/16. Eligible today 11/17 for skilled care. 0900: Spoke Maddi from [facility]. Maco Starr could take today pending financial screening. She said she would call patient around 10 am today. 1130: Patient accepted to [facility]. They can take him today. No bed # yet. Will let nrsg sup know when bed # assigned so she can arrange transport.

## 2023-11-17 NOTE — CARE COORDINATION
11/17/23 1238   Services At/After Discharge   Transition of Care Consult (CM Consult) SNF   Partner SNF No   Reason Why Partner SNF Not Chosen Bed availability   Services At/After Discharge [address] (SNF)   Mode of Transport at Discharge BLS   Confirm Follow Up Transport Other (see comment)  (Squad)   Condition of Participation: Discharge Planning   The Plan for Transition of Care is related to the following treatment goals: SNF   The Patient and/or Patient Representative was provided with a Choice of Provider? Patient   The Patient and/Or Patient Representative agree with the Discharge Plan? Yes   Freedom of Choice list was provided with basic dialogue that supports the patient's individualized plan of care/goals, treatment preferences, and shares the quality data associated with the providers? Yes       Mr. Fink is being discharged today to [facility] for skilled care with plan for LTC. UAI completed. Maddi from [facility] said she can access via the portal. Patient is aware and agrees with discharge plan. Transition of Care Plan:     RUR: 7% LOW   Prior Level of Functioning: Required assistance w/adls/iadls. Disposition:   If SNF or IPR: Date FOC offered:   Date FOC received:   Accepting facility:   Date authorization started with reference number:   Date authorization received and expires: Follow up appointments: To be arranged by [facility]. DME needed: TBD   Transportation at discharge:   IM/IMM Medicare/ letter given:   Is patient a [place] and connected with VA? If yes, was [employer] transfer form completed and VA notified? Caregiver Contact:   Discharge Caregiver contacted prior to discharge? Care Conference needed? Barriers to discharge: None       Transition of Care Plan to SNF/Rehab    Communication to Patient/Family:  Met with patient and family and they are agreeable to the transition plan.  The Plan for Transition of Care is screening has previously been completed. []Level II Completed    [] Private pay individual who will not become   financially eligible for Medicaid within 6 months from admission to a [address] [place] [address] facility. [] Individual refused to have screening conducted. []Medicaid Application Completed    []The screening denied because it was determined individual did not need/did not qualify for nursing facility level of care. [] Out of state residents seeking direct admission to a [facility]. [] Individuals who are inpatients of an out of state hospital, or in state or out of state veterans/ hospital and seek direct admission to a [facility]  [] Individuals who are pateints or residents of a state owned/operated facility that is licensed by [employer] and seek direct admission to [address] [place] [address]  [] A screening not required for enrollment in [address] [place] [address] as set out in [address] [place] [address] 66-  [] [facility] [place]) staff shall perform screenings of the [facility] clients. Advanced Care Plan:  []Surrogate Decision Maker of Care  []POA  [x]Communicated Code Status and copy sent.     Other:

## (undated) DEVICE — SYR 10ML LUER LOK 1/5ML GRAD --

## (undated) DEVICE — INTENDED FOR TISSUE SEPARATION, AND OTHER PROCEDURES THAT REQUIRE A SHARP SURGICAL BLADE TO PUNCTURE OR CUT.: Brand: BARD-PARKER ® CARBON RIB-BACK BLADES

## (undated) DEVICE — NEEDLE HYPO 25GA L1.5IN BVL ORIENTED ECLIPSE

## (undated) DEVICE — SURGICAL PROCEDURE PACK BASIN MAJ SET CUST NO CAUT

## (undated) DEVICE — TOWEL SURG W17XL27IN STD BLU COT NONFENESTRATED PREWASHED

## (undated) DEVICE — DBD-PACK,LAPAROTOMY,2 REINFORCED GOWNS: Brand: MEDLINE

## (undated) DEVICE — REM POLYHESIVE ADULT PATIENT RETURN ELECTRODE: Brand: VALLEYLAB

## (undated) DEVICE — KERLIX BANDAGE ROLL: Brand: KERLIX

## (undated) DEVICE — CYSTOSCOPY PACK: Brand: CONVERTORS

## (undated) DEVICE — ROCKER SWITCH PENCIL BLADE ELECTRODE, HOLSTER: Brand: EDGE

## (undated) DEVICE — IRRIGATION KT CYSTO/TUR 10ML -- BX/5 720-100

## (undated) DEVICE — GOWN,SIRUS,FABRNF,2XL,18/CS: Brand: MEDLINE

## (undated) DEVICE — HANDLE LT SNAP ON ULT DURABLE LENS FOR TRUMPF ALC DISPOSABLE

## (undated) DEVICE — DEVON™ KNEE AND BODY STRAP 60" X 3" (1.5 M X 7.6 CM): Brand: DEVON

## (undated) DEVICE — SOLUTION IV 1000ML 0.9% SOD CHL

## (undated) DEVICE — INFECTION CONTROL KIT SYS